# Patient Record
Sex: FEMALE | Race: WHITE | NOT HISPANIC OR LATINO | Employment: OTHER | URBAN - METROPOLITAN AREA
[De-identification: names, ages, dates, MRNs, and addresses within clinical notes are randomized per-mention and may not be internally consistent; named-entity substitution may affect disease eponyms.]

---

## 2011-02-01 LAB — CRC RECOMMENDATION EXT: NORMAL

## 2017-02-14 ENCOUNTER — HISTORICAL (OUTPATIENT)
Dept: RADIOLOGY | Facility: HOSPITAL | Age: 52
End: 2017-02-14

## 2017-07-18 ENCOUNTER — HISTORICAL (OUTPATIENT)
Dept: RADIOLOGY | Facility: HOSPITAL | Age: 52
End: 2017-07-18

## 2017-08-15 ENCOUNTER — HISTORICAL (OUTPATIENT)
Dept: RADIOLOGY | Facility: HOSPITAL | Age: 52
End: 2017-08-15

## 2018-02-15 ENCOUNTER — HISTORICAL (OUTPATIENT)
Dept: RADIOLOGY | Facility: HOSPITAL | Age: 53
End: 2018-02-15

## 2018-03-09 ENCOUNTER — HISTORICAL (OUTPATIENT)
Dept: RADIOLOGY | Facility: HOSPITAL | Age: 53
End: 2018-03-09

## 2018-08-21 ENCOUNTER — HISTORICAL (OUTPATIENT)
Dept: RADIOLOGY | Facility: HOSPITAL | Age: 53
End: 2018-08-21

## 2019-01-29 ENCOUNTER — HISTORICAL (OUTPATIENT)
Dept: ADMINISTRATIVE | Facility: HOSPITAL | Age: 54
End: 2019-01-29

## 2019-01-29 LAB
ALBUMIN SERPL-MCNC: 4.3 G/DL (ref 3.5–5.5)
ALBUMIN/GLOB SERPL: 1.4 {RATIO} (ref 1.2–2.2)
ALP SERPL-CCNC: 82 IU/L (ref 39–117)
ALT SERPL-CCNC: 12 IU/L (ref 0–32)
AST SERPL-CCNC: 14 IU/L (ref 0–40)
BASOPHILS # BLD AUTO: 0.1 X10E3/UL (ref 0–0.2)
BASOPHILS NFR BLD AUTO: 1 %
BILIRUB SERPL-MCNC: 0.7 MG/DL (ref 0–1.2)
BUN SERPL-MCNC: 9 MG/DL (ref 6–24)
CALCIUM SERPL-MCNC: 9.3 MG/DL (ref 8.7–10.2)
CHLORIDE SERPL-SCNC: 105 MMOL/L (ref 96–106)
CHOLEST SERPL-MCNC: 257 MG/DL (ref 100–199)
CHOLEST/HDLC SERPL: 2.6 RATIO (ref 0–4.4)
CO2 SERPL-SCNC: 21 MMOL/L (ref 20–29)
CREAT SERPL-MCNC: 0.58 MG/DL (ref 0.57–1)
CREAT/UREA NIT SERPL: 16 (ref 9–23)
EOSINOPHIL # BLD AUTO: 0.1 X10E3/UL (ref 0–0.4)
EOSINOPHIL NFR BLD AUTO: 2 %
ERYTHROCYTE [DISTWIDTH] IN BLOOD BY AUTOMATED COUNT: 13.2 % (ref 12.3–15.4)
GLOBULIN SER-MCNC: 3.1 G/DL (ref 1.5–4.5)
GLUCOSE SERPL-MCNC: 91 MG/DL (ref 65–99)
HCT VFR BLD AUTO: 39.1 % (ref 34–46.6)
HDLC SERPL-MCNC: 98 MG/DL
HGB BLD-MCNC: 12.9 G/DL (ref 11.1–15.9)
LDLC SERPL CALC-MCNC: 133 MG/DL (ref 0–99)
LYMPHOCYTES # BLD AUTO: 3.1 X10E3/UL (ref 0.7–3.1)
LYMPHOCYTES NFR BLD AUTO: 40 %
MCH RBC QN AUTO: 30.1 PG (ref 26.6–33)
MCHC RBC AUTO-ENTMCNC: 33 G/DL (ref 31.5–35.7)
MCV RBC AUTO: 91 FL (ref 79–97)
MONOCYTES # BLD AUTO: 0.4 X10E3/UL (ref 0.1–0.9)
MONOCYTES NFR BLD AUTO: 6 %
NEUTROPHILS # BLD AUTO: 4.1 X10E3/UL (ref 1.4–7)
NEUTROPHILS NFR BLD AUTO: 51 %
PLATELET # BLD AUTO: 291 X10E3/UL (ref 150–379)
POTASSIUM SERPL-SCNC: 4.1 MMOL/L (ref 3.5–5.2)
PROT SERPL-MCNC: 7.4 G/DL (ref 6–8.5)
RBC # BLD AUTO: 4.28 X10(6)/MCL (ref 3.77–5.28)
SODIUM SERPL-SCNC: 142 MMOL/L (ref 134–144)
TRIGL SERPL-MCNC: 128 MG/DL (ref 0–149)
TSH SERPL-ACNC: 1.51 MIU/ML (ref 0.45–4.5)
VLDLC SERPL CALC-MCNC: 26 MG/DL (ref 5–40)
WBC # SPEC AUTO: 7.8 X10E3/UL (ref 3.4–10.8)

## 2019-02-04 ENCOUNTER — HISTORICAL (OUTPATIENT)
Dept: RADIOLOGY | Facility: HOSPITAL | Age: 54
End: 2019-02-04

## 2019-02-11 ENCOUNTER — HISTORICAL (OUTPATIENT)
Dept: RADIOLOGY | Facility: HOSPITAL | Age: 54
End: 2019-02-11

## 2019-02-12 ENCOUNTER — HISTORICAL (OUTPATIENT)
Dept: RADIOLOGY | Facility: HOSPITAL | Age: 54
End: 2019-02-12

## 2019-02-20 ENCOUNTER — HISTORICAL (OUTPATIENT)
Dept: ADMINISTRATIVE | Facility: HOSPITAL | Age: 54
End: 2019-02-20

## 2019-02-25 ENCOUNTER — HISTORICAL (OUTPATIENT)
Dept: ADMINISTRATIVE | Facility: HOSPITAL | Age: 54
End: 2019-02-25

## 2019-02-25 LAB
ABS NEUT (OLG): 4.43 X10(3)/MCL (ref 2.1–9.2)
ALBUMIN SERPL-MCNC: 3.8 GM/DL (ref 3.4–5)
ALBUMIN/GLOB SERPL: 1 RATIO (ref 1–2)
ALP SERPL-CCNC: 90 UNIT/L (ref 45–117)
ALT SERPL-CCNC: 19 UNIT/L (ref 13–56)
APPEARANCE, UA: CLEAR
APTT PPP: 29.2 SECOND(S) (ref 24.8–36.9)
AST SERPL-CCNC: 11 UNIT/L (ref 15–37)
BILIRUB SERPL-MCNC: 0.9 MG/DL (ref 0.2–1)
BILIRUB UR QL STRIP: NEGATIVE
BILIRUBIN DIRECT+TOT PNL SERPL-MCNC: 0.1 MG/DL (ref 0–0.2)
BILIRUBIN DIRECT+TOT PNL SERPL-MCNC: 0.8 MG/DL (ref 0–1)
BUN SERPL-MCNC: 13 MG/DL (ref 7–18)
CALCIUM SERPL-MCNC: 8.9 MG/DL (ref 8.5–10.1)
CHLORIDE SERPL-SCNC: 106 MMOL/L (ref 98–107)
CO2 SERPL-SCNC: 25 MMOL/L (ref 21–32)
COLOR UR: YELLOW
CREAT SERPL-MCNC: 0.68 MG/DL (ref 0.55–1.02)
ERYTHROCYTE [DISTWIDTH] IN BLOOD BY AUTOMATED COUNT: 13.3 % (ref 11.5–17)
GLOBULIN SER-MCNC: 4 GM/DL (ref 2–4)
GLUCOSE (UA): NEGATIVE
GLUCOSE SERPL-MCNC: 83 MG/DL (ref 74–106)
HCT VFR BLD AUTO: 42.3 % (ref 37–47)
HGB BLD-MCNC: 13.8 GM/DL (ref 12–16)
HGB UR QL STRIP: NEGATIVE
INR PPP: 0.9 (ref 0–1.3)
KETONES UR QL STRIP: NEGATIVE
LEUKOCYTE ESTERASE UR QL STRIP: NEGATIVE
MCH RBC QN AUTO: 30.4 PG (ref 27–31)
MCHC RBC AUTO-ENTMCNC: 32.6 GM/DL (ref 33–36)
MCV RBC AUTO: 93.2 FL (ref 80–94)
NITRITE UR QL STRIP: NEGATIVE
PH UR STRIP: 5.5 [PH] (ref 5–7)
PLATELET # BLD AUTO: 287 X10(3)/MCL (ref 130–400)
PMV BLD AUTO: 10.6 FL (ref 7.4–10.4)
POTASSIUM SERPL-SCNC: 4.1 MMOL/L (ref 3.5–5.1)
PROT SERPL-MCNC: 7.9 GM/DL (ref 6.4–8.2)
PROT UR QL STRIP: NEGATIVE
PROTHROMBIN TIME: 12.4 SECOND(S) (ref 12.2–14.7)
RBC # BLD AUTO: 4.54 X10(6)/MCL (ref 4.2–5.4)
SODIUM SERPL-SCNC: 138 MMOL/L (ref 136–145)
SP GR UR STRIP: 1.02 (ref 1–1.03)
TRANSFERRIN SERPL-MCNC: 285 MG/DL (ref 200–360)
UROBILINOGEN UR STRIP-ACNC: NEGATIVE
WBC # SPEC AUTO: 8.6 X10(3)/MCL (ref 4.5–11.5)

## 2019-02-27 LAB — FINAL CULTURE: NORMAL

## 2019-03-07 ENCOUNTER — HOSPITAL ENCOUNTER (OUTPATIENT)
Dept: ORTHOPEDICS | Facility: HOSPITAL | Age: 54
End: 2019-03-08
Attending: SPECIALIST | Admitting: SPECIALIST

## 2019-03-07 LAB
HCT VFR BLD AUTO: 38.6 % (ref 37–47)
HGB BLD-MCNC: 12.8 GM/DL (ref 12–16)

## 2019-03-08 LAB
BUN SERPL-MCNC: 9 MG/DL (ref 7–18)
CALCIUM SERPL-MCNC: 8.1 MG/DL (ref 8.5–10.1)
CHLORIDE SERPL-SCNC: 109 MMOL/L (ref 98–107)
CO2 SERPL-SCNC: 25 MMOL/L (ref 21–32)
CREAT SERPL-MCNC: 0.63 MG/DL (ref 0.55–1.02)
CREAT/UREA NIT SERPL: 14
GLUCOSE SERPL-MCNC: 111 MG/DL (ref 74–106)
POTASSIUM SERPL-SCNC: 3.8 MMOL/L (ref 3.5–5.1)
SODIUM SERPL-SCNC: 141 MMOL/L (ref 136–145)

## 2019-04-11 LAB
INFLUENZA A ANTIGEN, POC: POSITIVE
INFLUENZA B ANTIGEN, POC: NEGATIVE
RAPID GROUP A STREP (OHS): NEGATIVE

## 2019-05-10 ENCOUNTER — HISTORICAL (OUTPATIENT)
Dept: RADIOLOGY | Facility: HOSPITAL | Age: 54
End: 2019-05-10

## 2019-05-10 LAB
BUN SERPL-MCNC: 11.6 MG/DL (ref 7–18)
CREAT SERPL-MCNC: 0.67 MG/DL (ref 0.6–1.3)

## 2019-06-24 ENCOUNTER — HISTORICAL (OUTPATIENT)
Dept: ADMINISTRATIVE | Facility: HOSPITAL | Age: 54
End: 2019-06-24

## 2019-08-06 ENCOUNTER — HISTORICAL (OUTPATIENT)
Dept: ADMINISTRATIVE | Facility: HOSPITAL | Age: 54
End: 2019-08-06

## 2019-08-06 LAB
ALBUMIN SERPL-MCNC: 4.6 G/DL (ref 3.5–5.5)
ALBUMIN/GLOB SERPL: 1.9 {RATIO} (ref 1.2–2.2)
ALP SERPL-CCNC: 90 IU/L (ref 39–117)
ALT SERPL-CCNC: 17 IU/L (ref 0–32)
AST SERPL-CCNC: 17 IU/L (ref 0–40)
BILIRUB SERPL-MCNC: 0.7 MG/DL (ref 0–1.2)
BUN SERPL-MCNC: 9 MG/DL (ref 6–24)
CALCIUM SERPL-MCNC: 9.9 MG/DL (ref 8.7–10.2)
CHLORIDE SERPL-SCNC: 104 MMOL/L (ref 96–106)
CHOLEST SERPL-MCNC: 265 MG/DL (ref 100–199)
CHOLEST/HDLC SERPL: 3.4 RATIO (ref 0–4.4)
CO2 SERPL-SCNC: 24 MMOL/L (ref 20–29)
CREAT SERPL-MCNC: 0.63 MG/DL (ref 0.57–1)
CREAT/UREA NIT SERPL: 14 (ref 9–23)
GLOBULIN SER-MCNC: 2.4 G/DL (ref 1.5–4.5)
GLUCOSE SERPL-MCNC: 96 MG/DL (ref 65–99)
HDLC SERPL-MCNC: 77 MG/DL
LDLC SERPL CALC-MCNC: 153 MG/DL (ref 0–99)
POTASSIUM SERPL-SCNC: 5 MMOL/L (ref 3.5–5.2)
PROT SERPL-MCNC: 7 G/DL (ref 6–8.5)
SODIUM SERPL-SCNC: 144 MMOL/L (ref 134–144)
TRIGL SERPL-MCNC: 173 MG/DL (ref 0–149)
VLDLC SERPL CALC-MCNC: 35 MG/DL (ref 5–40)

## 2019-08-16 ENCOUNTER — HISTORICAL (OUTPATIENT)
Dept: RADIOLOGY | Facility: HOSPITAL | Age: 54
End: 2019-08-16

## 2019-08-23 ENCOUNTER — HISTORICAL (OUTPATIENT)
Dept: RADIOLOGY | Facility: HOSPITAL | Age: 54
End: 2019-08-23

## 2019-11-04 ENCOUNTER — HISTORICAL (OUTPATIENT)
Dept: ADMINISTRATIVE | Facility: HOSPITAL | Age: 54
End: 2019-11-04

## 2019-12-04 ENCOUNTER — HISTORICAL (OUTPATIENT)
Dept: RADIOLOGY | Facility: HOSPITAL | Age: 54
End: 2019-12-04

## 2019-12-29 LAB
INFLUENZA A ANTIGEN, POC: NEGATIVE
INFLUENZA B ANTIGEN, POC: NEGATIVE
RAPID GROUP A STREP (OHS): NEGATIVE

## 2020-01-15 ENCOUNTER — HISTORICAL (OUTPATIENT)
Dept: ADMINISTRATIVE | Facility: HOSPITAL | Age: 55
End: 2020-01-15

## 2020-02-26 ENCOUNTER — HISTORICAL (OUTPATIENT)
Dept: ADMINISTRATIVE | Facility: HOSPITAL | Age: 55
End: 2020-02-26

## 2020-02-26 LAB
ALBUMIN SERPL-MCNC: 4.9 G/DL (ref 3.8–4.9)
ALBUMIN/GLOB SERPL: 1.7 {RATIO} (ref 1.2–2.2)
ALP SERPL-CCNC: 93 IU/L (ref 39–117)
ALT SERPL-CCNC: 35 IU/L (ref 0–32)
AST SERPL-CCNC: 25 IU/L (ref 0–40)
BASOPHILS # BLD AUTO: 0.1 X10E3/UL (ref 0–0.2)
BASOPHILS NFR BLD AUTO: 1 %
BILIRUB SERPL-MCNC: 0.7 MG/DL (ref 0–1.2)
BUN SERPL-MCNC: 11 MG/DL (ref 6–24)
CALCIUM SERPL-MCNC: 10.7 MG/DL (ref 8.7–10.2)
CHLORIDE SERPL-SCNC: 99 MMOL/L (ref 96–106)
CHOLEST SERPL-MCNC: 266 MG/DL (ref 100–199)
CHOLEST/HDLC SERPL: 3.1 RATIO (ref 0–4.4)
CO2 SERPL-SCNC: 24 MMOL/L (ref 20–29)
CREAT SERPL-MCNC: 0.86 MG/DL (ref 0.57–1)
CREAT/UREA NIT SERPL: 13 (ref 9–23)
EOSINOPHIL # BLD AUTO: 0.2 X10E3/UL (ref 0–0.4)
EOSINOPHIL NFR BLD AUTO: 2 %
ERYTHROCYTE [DISTWIDTH] IN BLOOD BY AUTOMATED COUNT: 13.6 % (ref 11.7–15.4)
GLOBULIN SER-MCNC: 2.9 G/DL (ref 1.5–4.5)
GLUCOSE SERPL-MCNC: 86 MG/DL (ref 65–99)
HCT VFR BLD AUTO: 44.6 % (ref 34–46.6)
HDLC SERPL-MCNC: 87 MG/DL
HGB BLD-MCNC: 14.5 G/DL (ref 11.1–15.9)
INFLUENZA A ANTIGEN, POC: NEGATIVE
INFLUENZA B ANTIGEN, POC: NEGATIVE
LDLC SERPL CALC-MCNC: 138 MG/DL (ref 0–99)
LYMPHOCYTES # BLD AUTO: 3.4 X10E3/UL (ref 0.7–3.1)
LYMPHOCYTES NFR BLD AUTO: 37 %
MCH RBC QN AUTO: 30.1 PG (ref 26.6–33)
MCHC RBC AUTO-ENTMCNC: 32.5 G/DL (ref 31.5–35.7)
MCV RBC AUTO: 93 FL (ref 79–97)
MONOCYTES # BLD AUTO: 0.7 X10E3/UL (ref 0.1–0.9)
MONOCYTES NFR BLD AUTO: 7 %
NEUTROPHILS # BLD AUTO: 4.9 X10E3/UL (ref 1.4–7)
NEUTROPHILS NFR BLD AUTO: 53 %
PLATELET # BLD AUTO: 310 X10E3/UL (ref 150–450)
POTASSIUM SERPL-SCNC: 4 MMOL/L (ref 3.5–5.2)
PROT SERPL-MCNC: 7.8 G/DL (ref 6–8.5)
RBC # BLD AUTO: 4.82 X10(6)/MCL (ref 3.77–5.28)
SODIUM SERPL-SCNC: 141 MMOL/L (ref 134–144)
TRIGL SERPL-MCNC: 206 MG/DL (ref 0–149)
TSH SERPL-ACNC: 1.6 MIU/ML (ref 0.45–4.5)
VLDLC SERPL CALC-MCNC: 41 MG/DL (ref 5–40)
WBC # SPEC AUTO: 9.3 X10E3/UL (ref 3.4–10.8)

## 2020-02-27 ENCOUNTER — HISTORICAL (OUTPATIENT)
Dept: RADIOLOGY | Facility: HOSPITAL | Age: 55
End: 2020-02-27

## 2020-09-01 ENCOUNTER — HISTORICAL (OUTPATIENT)
Dept: LAB | Facility: HOSPITAL | Age: 55
End: 2020-09-01

## 2020-09-01 LAB
ALBUMIN SERPL-MCNC: 3.6 GM/DL (ref 3.4–5)
ALBUMIN/GLOB SERPL: 0.9 RATIO (ref 1.1–2)
ALP SERPL-CCNC: 95 UNIT/L (ref 46–116)
ALT SERPL-CCNC: 28 UNIT/L (ref 12–78)
AST SERPL-CCNC: 18 UNIT/L (ref 15–37)
BILIRUB SERPL-MCNC: 0.7 MG/DL (ref 0.2–1)
BILIRUBIN DIRECT+TOT PNL SERPL-MCNC: 0.16 MG/DL (ref 0–0.2)
BILIRUBIN DIRECT+TOT PNL SERPL-MCNC: 0.54 MG/DL (ref 0–0.8)
BUN SERPL-MCNC: 10.5 MG/DL (ref 7–18)
CALCIUM SERPL-MCNC: 9.6 MG/DL (ref 8.5–10.1)
CHLORIDE SERPL-SCNC: 106 MMOL/L (ref 98–107)
CHOLEST SERPL-MCNC: 210 MG/DL (ref 0–200)
CHOLEST/HDLC SERPL: 2.6 {RATIO} (ref 0–4)
CO2 SERPL-SCNC: 29.4 MMOL/L (ref 21–32)
CREAT SERPL-MCNC: 0.8 MG/DL (ref 0.6–1.3)
GLOBULIN SER-MCNC: 4 GM/DL (ref 2.4–3.5)
GLUCOSE SERPL-MCNC: 101 MG/DL (ref 74–106)
HDLC SERPL-MCNC: 82 MG/DL (ref 40–60)
LDLC SERPL CALC-MCNC: 94 MG/DL (ref 0–129)
POTASSIUM SERPL-SCNC: 4 MMOL/L (ref 3.5–5.1)
PROT SERPL-MCNC: 7.6 GM/DL (ref 6.4–8.2)
SODIUM SERPL-SCNC: 140 MMOL/L (ref 136–145)
TRIGL SERPL-MCNC: 170 MG/DL
VLDLC SERPL CALC-MCNC: 34 MG/DL

## 2022-04-10 ENCOUNTER — HISTORICAL (OUTPATIENT)
Dept: ADMINISTRATIVE | Facility: HOSPITAL | Age: 57
End: 2022-04-10

## 2022-04-11 ENCOUNTER — HISTORICAL (OUTPATIENT)
Dept: ADMINISTRATIVE | Facility: HOSPITAL | Age: 57
End: 2022-04-11

## 2022-04-28 VITALS
OXYGEN SATURATION: 100 % | DIASTOLIC BLOOD PRESSURE: 82 MMHG | BODY MASS INDEX: 30.47 KG/M2 | SYSTOLIC BLOOD PRESSURE: 132 MMHG | WEIGHT: 189.63 LBS | HEIGHT: 66 IN

## 2022-04-28 VITALS
SYSTOLIC BLOOD PRESSURE: 132 MMHG | DIASTOLIC BLOOD PRESSURE: 82 MMHG | OXYGEN SATURATION: 100 % | HEIGHT: 66 IN | BODY MASS INDEX: 30.47 KG/M2 | WEIGHT: 189.63 LBS

## 2022-04-30 NOTE — OP NOTE
DATE OF SURGERY:    03/07/2019    SURGEON:  Matt Castro MD  ASSISTANT:  RACHEL Campa    PREOPERATIVE DIAGNOSIS:  Advanced osteoarthritis of medial compartment, right knee.    POSTOPERATIVE DIAGNOSIS:  Advanced osteoarthritis of medial compartment, right knee.    PROCEDURE PERFORMED:  Robotic-assisted medial compartment right knee, partial knee arthroplasty.    COUNTS:  Lap, needle, sponge counts correct.    COMPLICATIONS:  None.    ESTIMATED BLOOD LOSS:  Less than 10 mL.    IMPLANTS:  Gresham Restoris partial knee arthroplasty with a size 4 right femoral component, size 5 tibial component, 8 mm thickness polyethylene insert, tobramycin-impregnated cement.    HISTORY:  Ms. Scott is 54 years old with medial compartment osteoarthritis of the right knee, elected to undergo a right knee partial knee arthroplasty of the medial compartment after failed nonoperative treatment.  Risks, benefits, alternatives, and complications of operative and nonoperative treatment were explained.  She understood, agreed, and wanted to proceed with operative intervention.  Valid informed consent was obtained.    PROCEDURE IN DETAIL:  She was brought to the operating room and placed supine on the table, and underwent regional anesthesia.  She was sedated.  Bacon catheter was applied.  Tourniquet was placed on the right thigh.  The usual sterile ChloraPrep scrub and paint, followed by sterile draping was performed.  Ioban dressing was placed.  Esmarch bandage was used to exsanguinate the right lower extremity.  Tourniquet was inflated.  The knee was flexed.  Pins were placed into the femur and the tibia followed by assembly and registration of the femoral and tibial arrays.  Hip center and ankle center registration was performed.  Anteromedial incision was then made followed by coagulation of skin bleeders.  The capsule was incised.  A self-retaining retractor was placed.  The medial meniscus was excised.  The medial  compartment articular cartilage was denuded down to subchondral bone.  The lateral and patellofemoral articular cartilage were normal.  The anterior cruciate ligament was normal.  Medial and lateral ligaments were normal.  The femoral and tibial checkpoints were placed and registered.  Fine point registration of the femur and the tibia was performed, followed by excision of osteophytes and ligament balancing.  When the plane was balanced symmetrically and appropriately throughout a range of motion, robotic-assisted size 4 femoral preparation and size 5 tibial preparation was performed.  The most posterior aspect of the medial meniscus was removed.  Size 5 tibial trial, along with a size 4 femoral trial, and an 8 mm polyethylene trial were placed.  The knee was brought into full extension.  Range of motion was 0 degrees to 133 degrees.  There was excellent balancing and tracking throughout a range of motion.  Trials were removed.  Cut surfaces were irrigated, suctioned, and dried.  Tobramycin-impregnated cement was mixed.  When it was proper consistency, a size 5 tibial component was cemented into place, followed by cementation of a size 4 femoral component, and press-fit of an 8 mm thickness polyethylene insert.  Excess cement was excised.  Once cement had cured, tourniquet was deflated.  Hemostasis was obtained.  There was no abnormal bleeding.  The knee was irrigated, suctioned, and injected for postoperative pain control.  A range of motion was 0 degrees to 133 degrees with excellent balancing and tracking throughout a range of motion.  The arthrotomy was then closed with #1 braided suture followed by reapproximation of skin edges with 2-0 Vicryl suture, and surgical staples used for skin closure.  Sterile dressings were applied.  The patient was taken to recovery room in stable condition.        ______________________________  Matt Castro MD    SY/UN  DD:  03/07/2019  Time:  08:17AM  DT:  03/07/2019  Time:   08:48AM  Job #:  [id number]

## 2022-05-03 NOTE — HISTORICAL [facility] CERNER
This is a historical note converted from [employer]. Formatting and pictures may have been removed.  Please reference [employer] for original formatting and attached multimedia. Chief Complaint  Wellness Exam, pt also complains of cough and congestion  History of Present Illness  This is a 55-year-old white female who presents the clinic today for annual wellness exam.? Patient has a history of hyperlipidemia, hypertension, thyroid nodule, depression, GERD, arthritis.? Patient states she is doing well with her medications and denies any side effects. ?Does have a complaint today of cough, congestion, sneezing, watery eyes x2 days.? Denies fever or body aches.  Review of Systems  Constitutional: Negative except as documented in history of present illness.?  Eye: Negative except as documented in history of present illness.  Ear/Nose/Mouth/Throat: Negative except as documented in history of present illness.  Respiratory: Negative except as documented in history of present illness.  Cardiovascular: Negative except as documented in history of present illness.  Breast: Negative.  Gastrointestinal: Negative except as documented in history of present illness.  Genitourinary: Negative except as documented in history of present illness.  Gynecologic: Negative, Negative except as documented in history of present illness.  Hematology/Lymphatics: Negative except as documented in history of present illness.  Endocrine: Negative except as documented in history of present illness.  Immunologic: Negative except as documented in history of present illness.  Musculoskeletal: Negative except as documented in history of present illness.  Integumentary: Negative except as documented in history of present illness.  Neurologic: Negative except as documented in history of present illness.  Psychiatric: Negative except as documented in history of present illness.  All other systems are negative  ?  Physical Exam  Vitals & Measurements  T:?36.9? ?C  (Oral)? HR:?97(Peripheral)? RR:?17? BP:?121/81? SpO2:?100%?  HT:?167?cm? WT:?86?kg? BMI:?30.84?  General: Alert and oriented, No acute distress.?  Eye: Pupils are equal, round and reactive to light, Extraocular movements are intact, Normal conjunctiva.  HENT: Normocephalic, No damage to dentition, Tympanic membranes are clear, Good light reflex, Normal hearing, Oral mucosa is moist, No pharyngeal erythema, No sinus tenderness. ?Postnasal drip and rhinorrhea noted.  Neck: Supple, Non-tender.  Respiratory: Lungs are clear to auscultation, Respirations are non-labored, Breath sounds are equal, Symmetrical chest wall expansion.  Cardiovascular: Normal rate, Regular rhythm, No murmur, No edema.  Gastrointestinal: Soft, Non-tender, Non-distended, Normal bowel sounds.  Musculoskeletal: Normal range of motion, Normal strength, No tenderness, No swelling, No deformity, Normal gait.  Integumentary: Warm, Dry, Pink.  Neurologic: Alert, Oriented, Normal sensory, Normal motor function, No focal deficits.  Cognition and Speech: Oriented, Speech clear and coherent, Functional cognition intact.  Psychiatric: Cooperative, Appropriate mood & affect, Normal judgment, Non-suicidal.  ?  Assessment/Plan  1.?Annual physical exam?Z00.00  Ordered:  CBC w/ Auto Diff, Routine collect, 02/26/20 8:12:00 CST, Blood, [facility], Stop date 02/26/20 8:13:00 CST, Lab Collect, Annual physical exam, 02/26/20 8:12:00 CST  Comprehensive Metabolic Panel, Routine collect, 02/26/20 8:12:00 CST, Blood, [facility], Stop date 02/26/20 8:13:00 CST, Lab Collect, Annual physical exam, 02/26/20 8:12:00 CST  Lipid Panel, Routine collect, 02/26/20 8:12:00 CST, Blood, [facility], Stop date 02/26/20 8:13:00 CST, Lab Collect, Annual physical exam, 02/26/20 8:12:00 CST  Preventative Health Care Est 40-64 years 19699 PC, Annual physical exam  Hyperlipidemia LDL goal <130  Hypertension  Thyroid nodule  Breast cancer screening by mammogram  Acute URI,  [facility], 02/26/20 14:31:00 CST  Thyroid Stimulating Hormone, Routine collect, 02/26/20 8:12:00 CST, Blood, [facility], Stop date 02/26/20 8:13:00 CST, Lab Collect, Annual physical exam, 02/26/20 8:12:00 CST  ?  2.?Hyperlipidemia LDL goal <130?E78.5  ?Labs today, will call with results, follow-up 6 months.  Ordered:  Comprehensive Metabolic Panel, Routine collect, *Est. 08/26/20 3:00:00 CDT, Blood, Order for future visit, *Est. Stop date 08/26/20 3:00:00 CDT, Lab Collect, Hyperlipidemia LDL goal <130, 02/26/20 8:14:00 CST  Lipid Panel, Routine collect, *Est. 08/26/20 3:00:00 CDT, Blood, Order for future visit, *Est. Stop date 08/26/20 3:00:00 CDT, Lab Collect, Hyperlipidemia LDL goal <130, 02/26/20 8:14:00 CST  Preventative Health Care Est 40-64 years 15348 PC, Annual physical exam  Hyperlipidemia LDL goal <130  Hypertension  Thyroid nodule  Breast cancer screening by mammogram  Acute UR, [facility], 02/26/20 14:31:00 CST  ?  3.?Hypertension?I10  ?Stable, continue current meds, follow-up 6 months.  Ordered:  Preventative Health Care Est 40-64 years 93534 PC, Annual physical exam  Hyperlipidemia LDL goal <130  Hypertension  Thyroid nodule  Breast cancer screening by mammogram  Acute URI, [facility], 02/26/20 14:31:00 CST  ?  4.?Thyroid nodule?E04.1  ?Repeat ultrasound ordered  Ordered:  Preventative Health Care Est 40-64 years 46042 PC, Annual physical exam  Hyperlipidemia LDL goal <130  Hypertension  Thyroid nodule  Breast cancer screening by mammogram  Acute URI, [facility], 02/26/20 14:31:00 CST  ?  5.?Breast cancer screening by mammogram?Z12.31  ?Mammogram ordered for this summer.  Ordered:  [facility] Screening Bilateral, Routine, *Est. 08/26/20 9:00:00 CDT, Screening, Z12.31, None, Ambulatory, Patient Has IV?, Due after 8/23/2020, Rad Type, Order for future visit, Breast cancer screening by  mammogram, Schedule this test, [facility], *Est. 08/26/20 9:00:00 CDT  Preventative Health Care Est 40-64 years 62871 PC, Annual physical exam  Hyperlipidemia LDL goal <130  Hypertension  Thyroid nodule  Breast cancer screening by mammogram  Acute URI, [facility], 02/26/20 14:31:00 CST  ?  6.?Acute URI?J06.9  ?Celestone?12 mg IM today.  Ordered:  betamethasone, 12 mg, IM, Once-Unscheduled, first dose 02/26/20 14:31:00 CST  Preventative Health Care Est 40-64 years 39930 PC, Annual physical exam  Hyperlipidemia LDL goal <130  Hypertension  Thyroid nodule  Breast cancer screening by mammogram  Acute URI, [facility], 02/26/20 14:31:00 CST  ?  Orders:  Flu A/B POC [id number] PC, 02/26/20 14:31:00 CST, [facility]   Problem List/Past Medical History  Ongoing  Depression  GERD - Gastro-esophageal reflux disease  History of unicondylar arthroplasty of right knee  Hyperlipidemia LDL goal <130  Hypertension  Lateral epicondylitis, left elbow  Obesity  Osteoarthritis of right knee  Primary osteoarthritis of right hip  Statin-induced myositis  Status post total hip replacement, right  Thyroid nodule  Historical  Arthritis  Back pain  Bone Injury  Decreased vision  Diarrhea  Hair loss  History of chicken pox  History of measles  Leg pain  Vocal cord paralysis  Procedure/Surgical History  [facility] Total Hip Arthroplasty (Right) (12/17/2019)  Replacement of Right Hip Joint with Synthetic Substitute, Uncemented, Open Approach (12/17/2019)  Robotic Assisted Procedure of Lower Extremity, Open Approach (12/17/2019)  Arthroplasty, knee, condyle and plateau; medial OR lateral compartment (03/07/2019)  [facility] Partial Knee Arthroplasty (Right) (03/07/2019)  Replacement of Right Knee Joint with Medial Unicondylar Synthetic Substitute, Cemented, Open Approach (03/07/2019)  Fine needle aspiration biopsy, including ultrasound guidance; each additional lesion  (List separately in addition to code for primary procedure) (02/11/2019)  Fine needle aspiration biopsy, including ultrasound guidance; each additional lesion (List separately in addition to code for primary procedure) (02/11/2019)  Fine needle aspiration biopsy, including ultrasound guidance; first lesion (02/11/2019)  Inspection of Thyroid Gland, Percutaneous Approach (02/11/2019)  Excision of Left Breast, Open Approach (10/05/2016)  Lumpectomy Breast (Left) (10/05/2016)  Mastectomy, partial (eg, lumpectomy, tylectomy, quadrantectomy, segmentectomy); (10/05/2016)  Biopsy, breast, with placement of breast localization device(s) (eg, clip, metallic pellet), when performed, and imaging of the biopsy specimen, when performed, percutaneous; first lesion, including ultrasound guidance (08/30/2016)  Excision of Left Breast, Percutaneous Approach, Diagnostic (08/30/2016)  ACL - Repair of anterior cruciate ligament  Appendectomy  Arthroscopic repair of meniscus  Carpal tunnel release  colonoscopy  cyst back of neck  cyst left wrist  Decompression of ulnar nerve  Hysterectomy  lumpectomy left breast  Total replacement of left hip joint   Medications  aspirin 81 mg oral Delayed Release (EC) tablet, 81 mg= 1 tab(s), Oral, BID  hydrochlorothiazide 25 mg oral tablet, 25 mg= 1 tab(s), Oral, Daily, 1 refills  ibuprofen 800 mg oral tablet, 800 mg= 1 tab(s), Oral, TID  omeprazole 40 mg oral DR capsule, 40 mg= 1 cap(s), Oral, qPM  Toradol 10 mg oral tablet, 10 mg= 1 tab(s), Oral, QID  Zetia 10 mg oral tablet, 10 mg= 1 tab(s), Oral, Daily, 1 refills  Allergies  No Known Allergies  No Known Medication Allergies  Social History  Abuse/Neglect  No, 12/29/2019  No, 12/17/2019  No, 12/04/2019  Alcohol  Current, Beer, Liquor, 1-2 times per year, 12/04/2019  Current, Beer, 1-2 times per month, 02/25/2019  Current, 08/11/2016  Employment/School  Retired, 12/04/2019  Retired, 02/25/2019  Exercise  Home/Environment  Lives with Children,  Spouse., 12/04/2019  Lives with Children, Spouse., 02/25/2019  Nutrition/Health  Regular, 12/04/2019  Regular, 02/25/2019  Substance Use  Never, 12/04/2019  Never, 02/25/2019  Tobacco  Former smoker, quit more than 30 days ago, N/A, 01/15/2020  Family History  Acute myocardial infarction.: Negative: Father.  Alzheimers disease: Uncle and Uncle.  Atrial fibrillation: Sister.  Cardiac arrhythmia.: Brother.  Primary malignant neoplasm of lung: Mother.  Tobacco use: Mother, Father, Sister and Brother.  Health Maintenance  Health Maintenance  ???Pending?(in the next year)  ??? ??OverDue  ??? ? ? ?Coronary Artery Disease Maintenance-Antiplatelet Agent Prescribed due??and every?  ??? ? ? ?Coronary Artery Disease Maintenance-Lipid Lowering Therapy due??and every?  ??? ? ? ?Diabetes Screening due??and every?  ??? ??Due In Future?  ??? ? ? ?Aspirin Therapy for CVD Prevention not due until??12/18/20??and every 1??year(s)  ??? ? ? ?Hypertension Management-BMP not due until??12/28/20??and every 1??year(s)  ??? ? ? ?Alcohol Misuse Screening not due until??01/01/21??and every 1??year(s)  ??? ? ? ?Obesity Screening not due until??01/01/21??and every 1??year(s)  ??? ? ? ?Colorectal Screening not due until??01/29/21??and every 10??year(s)  ??? ? ? ?Blood Pressure Screening not due until??02/25/21??and every 1??year(s)  ??? ? ? ?Body Mass Index Check not due until??02/25/21??and every 1??year(s)  ??? ? ? ?Hypertension Management-Blood Pressure not due until??02/25/21??and every 1??year(s)  ???Satisfied?(in the past 1 year)  ??? ??Satisfied?  ??? ? ? ?ADL Screening on??02/26/20.??Satisfied by Navarrete-Suzanne LPN, Noemi M.  ??? ? ? ?Alcohol Misuse Screening on??02/26/20.??Satisfied by Noemi Michaud LPN  ??? ? ? ?Aspirin Therapy for CVD Prevention on??12/18/19.??Satisfied by Jeannie Vanessa NP  ??? ? ? ?Blood Pressure Screening on??02/26/20.??Satisfied by Noemi Michaud LPN  ??? ? ? ?Body Mass Index Check  on??02/26/20.??Satisfied by Noemi Michaud LPN  ??? ? ? ?Breast Cancer Screening on??08/23/19.??Satisfied by Amairani Burciaga  ??? ? ? ?Coronary Artery Disease Maintenance-Antiplatelet Agent Prescribed on??12/18/19.??Satisfied by Jeannie Vanessa NP  ??? ? ? ?Depression Screening on??02/26/20.??Satisfied by Noemi Michaud LPN  ??? ? ? ?Diabetes Screening on??12/29/19.??Satisfied by Jalen Lake  ??? ? ? ?Hypertension Management-Blood Pressure on??02/26/20.??Satisfied by Noemi Michaud LPN  ??? ? ? ?Hypertension Management-Education on??02/26/20.??Satisfied by Noemi Michaud LPN??Reason: Expectation Satisfied Elsewhere  ??? ? ? ?Hypertension Management-BMP on??12/29/19.??Satisfied by Jalen Lake  ??? ? ? ?Influenza Vaccine on??12/29/19.??Satisfied by Thomas Rivera LPN  ??? ? ? ?Lipid Screening on??08/06/19.??Satisfied by Cont[facility]system, [email or web address]  ??? ? ? ?Obesity Screening on??02/26/20.??Satisfied by Noemi Michaud LPN  ?  Lab Results  Test Name Test Result Date/Time   Influenza A POC Negative 02/26/2020 14:31 CST   Influenza B POC Negative 02/26/2020 14:31 CST

## 2022-09-21 ENCOUNTER — HISTORICAL (OUTPATIENT)
Dept: ADMINISTRATIVE | Facility: HOSPITAL | Age: 57
End: 2022-09-21

## 2024-01-22 ENCOUNTER — OFFICE VISIT (OUTPATIENT)
Dept: FAMILY MEDICINE | Facility: CLINIC | Age: 59
End: 2024-01-22
Payer: COMMERCIAL

## 2024-01-22 VITALS
RESPIRATION RATE: 16 BRPM | TEMPERATURE: 98 F | BODY MASS INDEX: 33.05 KG/M2 | SYSTOLIC BLOOD PRESSURE: 136 MMHG | OXYGEN SATURATION: 97 % | DIASTOLIC BLOOD PRESSURE: 86 MMHG | HEART RATE: 87 BPM | WEIGHT: 205.63 LBS | HEIGHT: 66 IN

## 2024-01-22 DIAGNOSIS — I10 PRIMARY HYPERTENSION: ICD-10-CM

## 2024-01-22 DIAGNOSIS — E04.1 THYROID NODULE: ICD-10-CM

## 2024-01-22 DIAGNOSIS — Z00.00 ANNUAL PHYSICAL EXAM: ICD-10-CM

## 2024-01-22 DIAGNOSIS — Z13.1 SCREENING FOR DIABETES MELLITUS: ICD-10-CM

## 2024-01-22 DIAGNOSIS — E78.5 HYPERLIPIDEMIA, UNSPECIFIED HYPERLIPIDEMIA TYPE: ICD-10-CM

## 2024-01-22 DIAGNOSIS — F32.A DEPRESSIVE DISORDER: ICD-10-CM

## 2024-01-22 DIAGNOSIS — Z76.89 ENCOUNTER TO ESTABLISH CARE: Primary | ICD-10-CM

## 2024-01-22 DIAGNOSIS — Z78.0 POST-MENOPAUSE: ICD-10-CM

## 2024-01-22 DIAGNOSIS — N64.52 NIPPLE DISCHARGE: ICD-10-CM

## 2024-01-22 DIAGNOSIS — Z91.89 AT HIGH RISK FOR BREAST CANCER: ICD-10-CM

## 2024-01-22 DIAGNOSIS — Z11.59 NEED FOR HEPATITIS C SCREENING TEST: ICD-10-CM

## 2024-01-22 DIAGNOSIS — Z12.31 BREAST CANCER SCREENING BY MAMMOGRAM: ICD-10-CM

## 2024-01-22 DIAGNOSIS — Z12.11 COLON CANCER SCREENING: ICD-10-CM

## 2024-01-22 DIAGNOSIS — R93.1 AGATSTON CAC SCORE 100-199: ICD-10-CM

## 2024-01-22 PROBLEM — E66.9 OBESITY: Status: ACTIVE | Noted: 2024-01-22

## 2024-01-22 PROBLEM — Z96.641 HISTORY OF TOTAL RIGHT HIP ARTHROPLASTY: Status: ACTIVE | Noted: 2024-01-22

## 2024-01-22 PROBLEM — M17.11 OSTEOARTHRITIS OF RIGHT KNEE: Status: ACTIVE | Noted: 2024-01-22

## 2024-01-22 PROBLEM — K21.9 GASTROESOPHAGEAL REFLUX DISEASE: Status: ACTIVE | Noted: 2024-01-22

## 2024-01-22 PROBLEM — M77.12 LATERAL EPICONDYLITIS OF LEFT ELBOW: Status: ACTIVE | Noted: 2024-01-22

## 2024-01-22 PROBLEM — M60.9 MYOSITIS: Status: ACTIVE | Noted: 2024-01-22

## 2024-01-22 PROCEDURE — 3075F SYST BP GE 130 - 139MM HG: CPT | Mod: CPTII,,, | Performed by: NURSE PRACTITIONER

## 2024-01-22 PROCEDURE — 1160F RVW MEDS BY RX/DR IN RCRD: CPT | Mod: CPTII,,, | Performed by: NURSE PRACTITIONER

## 2024-01-22 PROCEDURE — 99204 OFFICE O/P NEW MOD 45 MIN: CPT | Mod: ,,, | Performed by: NURSE PRACTITIONER

## 2024-01-22 PROCEDURE — 1159F MED LIST DOCD IN RCRD: CPT | Mod: CPTII,,, | Performed by: NURSE PRACTITIONER

## 2024-01-22 PROCEDURE — 3008F BODY MASS INDEX DOCD: CPT | Mod: CPTII,,, | Performed by: NURSE PRACTITIONER

## 2024-01-22 PROCEDURE — 3079F DIAST BP 80-89 MM HG: CPT | Mod: CPTII,,, | Performed by: NURSE PRACTITIONER

## 2024-01-22 RX ORDER — EZETIMIBE 10 MG/1
10 TABLET ORAL DAILY
Qty: 90 TABLET | Refills: 3 | Status: SHIPPED | OUTPATIENT
Start: 2024-01-22 | End: 2025-01-21

## 2024-01-22 NOTE — ASSESSMENT & PLAN NOTE
Has been off of Zetia since about 2019 when her  lost his job and they lost their insurance.  Restart Zetia.  Lipid panel to be completed prior to wellness in about a month.

## 2024-01-22 NOTE — PROGRESS NOTES
Subjective:       Patient ID: Ariela Scott is a 59 y.o. female.    Chief Complaint: Establish Care      HPI   This is a 59-year-old white female who presents to clinic today to reestablish care.  Patient has been lost follow-up for the last 4 years due to losing her insurance.  Has not seen any primary care in this time frame.  No complaints today.  Review of Systems  Comprehensive review of systems negative except as stated in HPI    The patient's Health Maintenance was reviewed and the following appears to be due:   Health Maintenance Due   Topic Date Due    Hepatitis C Screening  Never done    Pneumococcal Vaccines (Age 0-64) (1 - PCV) Never done    TETANUS VACCINE  Never done    Hemoglobin A1c (Diabetic Prevention Screening)  Never done    Shingles Vaccine (1 of 2) Never done    Mammogram  08/23/2020    Colorectal Cancer Screening  02/01/2021    RSV Vaccine (Age 60+ and Pregnant patients) (1 - 1-dose 60+ series) Never done       Past Medical History:  Past Medical History:   Diagnosis Date    Agatston CAC score 100-199     Depression     GERD (gastroesophageal reflux disease)     Hyperlipidemia     Hypertension     Lateral epicondylitis of left elbow     Myositis     Osteoarthritis of right knee      Past Surgical History:   Procedure Laterality Date    ANTERIOR CRUCIATE LIGAMENT REPAIR Left     CARPAL TUNNEL RELEASE Right     CARPAL TUNNEL RELEASE Left     ELBOW SURGERY      HYSTERECTOMY      KNEE CARTILAGE SURGERY Right     TOTAL HIP ARTHROPLASTY      TOTAL KNEE ARTHROPLASTY Right     Partial     Review of patient's allergies indicates:  No Known Allergies  No current outpatient medications on file prior to visit.     No current facility-administered medications on file prior to visit.     Social History     Socioeconomic History    Marital status:    Tobacco Use    Smoking status: Every Day     Types: Vaping with nicotine     Passive exposure: Current    Smokeless tobacco: Never   Substance  "and Sexual Activity    Alcohol use: Not Currently    Drug use: Never    Sexual activity: Yes     Partners: Male     Family History   Problem Relation Age of Onset    Lung cancer Mother     Atrial fibrillation Sister     Breast cancer Sister 62    Atrial fibrillation Sister     Anaya Parkinson White syndrome Brother     Supraventricular tachycardia Son        Objective:       /86 (BP Location: Left arm)   Pulse 87   Temp 98.1 °F (36.7 °C) (Oral)   Resp 16   Ht 5' 5.75" (1.67 m)   Wt 93.3 kg (205 lb 9.6 oz)   SpO2 97%   BMI 33.44 kg/m²      Physical Exam  Vitals and nursing note reviewed.   Constitutional:       Appearance: Normal appearance. She is obese.   HENT:      Head: Normocephalic and atraumatic.      Right Ear: Tympanic membrane, ear canal and external ear normal.      Left Ear: Tympanic membrane, ear canal and external ear normal.      Nose: Nose normal.      Mouth/Throat:      Mouth: Mucous membranes are moist.      Pharynx: Oropharynx is clear.   Eyes:      Extraocular Movements: Extraocular movements intact.      Conjunctiva/sclera: Conjunctivae normal.      Pupils: Pupils are equal, round, and reactive to light.   Cardiovascular:      Rate and Rhythm: Normal rate and regular rhythm.      Heart sounds: Normal heart sounds.   Pulmonary:      Effort: Pulmonary effort is normal.      Breath sounds: Normal breath sounds.   Musculoskeletal:         General: Normal range of motion.      Cervical back: Normal range of motion and neck supple.   Skin:     General: Skin is warm and dry.   Neurological:      General: No focal deficit present.      Mental Status: She is alert and oriented to person, place, and time.   Psychiatric:         Mood and Affect: Mood normal.         Behavior: Behavior normal.         Thought Content: Thought content normal.         Judgment: Judgment normal.         Labs  Office Visit on 01/22/2024   Component Date Value Ref Range Status    CRC Recommendation External " 02/01/2011 Repeat colonoscopy in 10 years   Final       Assessment and Plan       ICD-10-CM ICD-9-CM   1. Encounter to establish care  Z76.89 V65.8   2. Primary hypertension  I10 401.9   3. Thyroid nodule  E04.1 241.0   4. Hyperlipidemia, unspecified hyperlipidemia type  E78.5 272.4   5. Agatston CAC score 100-199  R93.1 793.2   6. Depressive disorder  F32.A 311   7. At high risk for breast cancer  Z91.89 V49.89   8. Post-menopause  Z78.0 V49.81   9. Annual physical exam  Z00.00 V70.0   10. Need for hepatitis C screening test  Z11.59 V73.89   11. Breast cancer screening by mammogram  Z12.31 V76.12   12. Screening for diabetes mellitus  Z13.1 V77.1   13. Colon cancer screening  Z12.11 V76.51        1. Encounter to establish care    2. Primary hypertension  Overview:  Diet controlled    Assessment & Plan:  Blood pressure okay in office today.  136/86.  Did discuss ways to naturally lower blood pressure, will keep an eye on this.  Follow-up in 4-6 weeks for wellness.      3. Thyroid nodule  Overview:  Dr Cortez  Negative FNA 02/11/2019 08/16/2019 - US Thyroid  1.  Stable size of peripherally calcified 1.5 cm left thyroid nodule  with previous nondiagnostic biopsy.  2.  Stable 1 cm right thyroid nodule previously biopsied as benign.  3.  Two newly identified hypoechoic nodules in the left thyroid lobe,  one with poorly defined borders measuring up to 9 mm in size. This can  be followed up with ultrasound.    02/27/2020 - US thyroid - Multinodular goiter not significantly changed. Recommend routine  surveillance.          Assessment & Plan:  Thyroid ultrasound ordered to be completed prior to wellness in 4-6 weeks.    Orders:  -     US Thyroid; Future; Expected date: 01/22/2024    4. Hyperlipidemia, unspecified hyperlipidemia type  Overview:  Simvastatin and pravastatin both caused myalgias    02/15/95780 - coronary artery calcium score 132.4, referred to Dr Boone     03/12/2018 - normal nuclear stress  test    03/15/2018 - TTE with normal LV function. Trace aortic, mitral, and tricuspid regurgitation.    03/29/20218 - Zetia 10 mg daily    Assessment & Plan:  Has been off of Zetia since about 2019 when her  lost his job and they lost their insurance.  Restart Zetia.  Lipid panel to be completed prior to wellness in about a month.    Orders:  -     ezetimibe (ZETIA) 10 mg tablet; Take 1 tablet (10 mg total) by mouth once daily.  Dispense: 90 tablet; Refill: 3    5. Agatston CAC score 100-199  Overview:  02/15/32813 - coronary artery calcium score 132.4, referred to Dr Boone     03/12/2018 - normal nuclear stress test    03/15/2018 - TTE with normal LV function. Trace aortic, mitral, and tricuspid regurgitation.    Assessment & Plan:  Denies any anginal symptoms, will get a repeat CT calcium score maybe at next visit, she had many other tests to complete 1st that I ordered today.  Patient okay with this plan.      6. Depressive disorder  Overview:  08/03/2018 - start Cymbalta 60 mg daily due to depression after hip replacement, stopped Cymbalta 2019    Assessment & Plan:  No longer on Cymbalta, doing well without it.  Denies any depression.      7. At high risk for breast cancer  Overview:  Dr Moraes    37.3 % lifetime risk per Tyrer-Cuzick with personal history of left breast ALH and intraductal papilloma status post excisional biopsy in 2016 at age of 51    02/12/2019 - MRI breast bilateral, normal      Assessment & Plan:  Has not had any imaging since 2019.  Mammogram ordered to be completed at [facility] breast [place], will get follow-up MRI 6 months later.    Orders:  -     Mammo Digital Screening Bilat w/ Mark; Future; Expected date: 01/22/2024    8. Post-menopause  Overview:  Total hysterectomy and bilateral oophorectomy in 2000, took hormones for approximately 10 years    Assessment & Plan:  Baseline DEXA ordered to be completed with mammogram, follow-up in clinic in 4-6  weeks.    Orders:  -     DXA Bone Density Axial Skeleton 1 or more sites; Future; Expected date: 01/22/2024    9. Annual physical exam  Assessment & Plan:  Return to clinic in 4-6 weeks for wellness with labs prior.  Declines vaccinations today, will think about it and discuss at wellness.    Orders:  -     CBC Auto Differential; Future; Expected date: 01/22/2024  -     Comprehensive Metabolic Panel; Future; Expected date: 01/22/2024  -     Lipid Panel; Future; Expected date: 01/22/2024  -     TSH; Future; Expected date: 01/22/2024    10. Need for hepatitis C screening test  -     Hepatitis C Antibody; Future; Expected date: 01/22/2024    11. Breast cancer screening by mammogram  -     Mammo Digital Screening Bilat w/ Mark; Future; Expected date: 01/22/2024    12. Screening for diabetes mellitus  -     Hemoglobin A1C; Future; Expected date: 01/22/2024    13. Colon cancer screening  Overview:  Dr Mendoza  02/01/2011 - colonoscopy, 1 polyp, hyperplastic, repeat 10 years    Assessment & Plan:  Patient overdue for repeat colonoscopy, referral sent to Dr. Mendoza    Orders:  -     Ambulatory referral/consult to Gastroenterology; Future; Expected date: 01/29/2024           Follow up in about 6 weeks (around 3/4/2024) for Annual.

## 2024-01-22 NOTE — ASSESSMENT & PLAN NOTE
Blood pressure okay in office today.  136/86.  Did discuss ways to naturally lower blood pressure, will keep an eye on this.  Follow-up in 4-6 weeks for wellness.

## 2024-01-22 NOTE — ASSESSMENT & PLAN NOTE
Denies any anginal symptoms, will get a repeat CT calcium score maybe at next visit, she had many other tests to complete 1st that I ordered today.  Patient okay with this plan.

## 2024-01-22 NOTE — ASSESSMENT & PLAN NOTE
Return to clinic in 4-6 weeks for wellness with labs prior.  Declines vaccinations today, will think about it and discuss at wellness.

## 2024-01-22 NOTE — ASSESSMENT & PLAN NOTE
Has not had any imaging since 2019.  Mammogram ordered to be completed at [facility] breast [place], will get follow-up MRI 6 months later.

## 2024-01-29 ENCOUNTER — HOSPITAL ENCOUNTER (OUTPATIENT)
Dept: RADIOLOGY | Facility: HOSPITAL | Age: 59
Discharge: HOME | End: 2024-01-29
Attending: NURSE PRACTITIONER
Payer: COMMERCIAL

## 2024-01-29 DIAGNOSIS — Z78.0 POST-MENOPAUSE: ICD-10-CM

## 2024-01-29 DIAGNOSIS — E04.1 THYROID NODULE: ICD-10-CM

## 2024-01-29 DIAGNOSIS — Z12.31 BREAST CANCER SCREENING BY MAMMOGRAM: ICD-10-CM

## 2024-01-29 DIAGNOSIS — Z91.89 AT HIGH RISK FOR BREAST CANCER: ICD-10-CM

## 2024-01-29 PROCEDURE — 77080 DXA BONE DENSITY AXIAL: CPT | Mod: TC

## 2024-01-29 PROCEDURE — 76536 US EXAM OF HEAD AND NECK: CPT | Mod: TC

## 2024-01-30 ENCOUNTER — TELEPHONE (OUTPATIENT)
Dept: FAMILY MEDICINE | Facility: CLINIC | Age: 59
End: 2024-01-30
Payer: COMMERCIAL

## 2024-01-30 NOTE — TELEPHONE ENCOUNTER
----- Message from GIRISH Reynaga sent at 1/30/2024  7:49 AM CST -----  DEXA normal. Repeat 2 years.

## 2024-01-31 ENCOUNTER — LAB VISIT (OUTPATIENT)
Dept: LAB | Facility: HOSPITAL | Age: 59
End: 2024-01-31
Attending: NURSE PRACTITIONER
Payer: COMMERCIAL

## 2024-01-31 DIAGNOSIS — Z11.59 NEED FOR HEPATITIS C SCREENING TEST: ICD-10-CM

## 2024-01-31 DIAGNOSIS — Z00.00 ANNUAL PHYSICAL EXAM: ICD-10-CM

## 2024-01-31 DIAGNOSIS — Z13.1 SCREENING FOR DIABETES MELLITUS: ICD-10-CM

## 2024-01-31 LAB
ALBUMIN SERPL-MCNC: 3.8 G/DL (ref 3.4–4.8)
ALBUMIN/GLOB SERPL: 1.2 RATIO (ref 1.1–2)
ALP SERPL-CCNC: 91 UNIT/L (ref 40–150)
ALT SERPL-CCNC: 17 UNIT/L (ref 0–55)
AST SERPL-CCNC: 15 UNIT/L (ref 5–34)
BASOPHILS # BLD AUTO: 0.05 X10(3)/MCL
BASOPHILS NFR BLD AUTO: 0.7 %
BILIRUB SERPL-MCNC: 1.3 MG/DL
BUN SERPL-MCNC: 8.3 MG/DL (ref 9.8–20.1)
CALCIUM SERPL-MCNC: 9.5 MG/DL (ref 8.4–10.2)
CHLORIDE SERPL-SCNC: 103 MMOL/L (ref 98–107)
CHOLEST SERPL-MCNC: 263 MG/DL
CHOLEST/HDLC SERPL: 3 {RATIO} (ref 0–5)
CO2 SERPL-SCNC: 26 MMOL/L (ref 23–31)
CREAT SERPL-MCNC: 0.74 MG/DL (ref 0.55–1.02)
EOSINOPHIL # BLD AUTO: 0.23 X10(3)/MCL (ref 0–0.9)
EOSINOPHIL NFR BLD AUTO: 3.4 %
ERYTHROCYTE [DISTWIDTH] IN BLOOD BY AUTOMATED COUNT: 13.3 % (ref 11.5–17)
EST. AVERAGE GLUCOSE BLD GHB EST-MCNC: 116.9 MG/DL
GFR SERPLBLD CREATININE-BSD FMLA CKD-EPI: >60 MLS/MIN/1.73/M2
GLOBULIN SER-MCNC: 3.3 GM/DL (ref 2.4–3.5)
GLUCOSE SERPL-MCNC: 99 MG/DL (ref 82–115)
HBA1C MFR BLD: 5.7 %
HCT VFR BLD AUTO: 43.7 % (ref 37–47)
HCV AB SERPL QL IA: NONREACTIVE
HDLC SERPL-MCNC: 80 MG/DL (ref 35–60)
HGB BLD-MCNC: 14.1 G/DL (ref 12–16)
IMM GRANULOCYTES # BLD AUTO: 0.01 X10(3)/MCL (ref 0–0.04)
IMM GRANULOCYTES NFR BLD AUTO: 0.1 %
LDLC SERPL CALC-MCNC: 159 MG/DL (ref 50–140)
LYMPHOCYTES # BLD AUTO: 1.87 X10(3)/MCL (ref 0.6–4.6)
LYMPHOCYTES NFR BLD AUTO: 27.4 %
MCH RBC QN AUTO: 29.3 PG (ref 27–31)
MCHC RBC AUTO-ENTMCNC: 32.3 G/DL (ref 33–36)
MCV RBC AUTO: 90.9 FL (ref 80–94)
MONOCYTES # BLD AUTO: 0.41 X10(3)/MCL (ref 0.1–1.3)
MONOCYTES NFR BLD AUTO: 6 %
NEUTROPHILS # BLD AUTO: 4.25 X10(3)/MCL (ref 2.1–9.2)
NEUTROPHILS NFR BLD AUTO: 62.4 %
PLATELET # BLD AUTO: 309 X10(3)/MCL (ref 130–400)
PMV BLD AUTO: 10.8 FL (ref 7.4–10.4)
POTASSIUM SERPL-SCNC: 4.1 MMOL/L (ref 3.5–5.1)
PROT SERPL-MCNC: 7.1 GM/DL (ref 5.8–7.6)
RBC # BLD AUTO: 4.81 X10(6)/MCL (ref 4.2–5.4)
SODIUM SERPL-SCNC: 139 MMOL/L (ref 136–145)
TRIGL SERPL-MCNC: 121 MG/DL (ref 37–140)
TSH SERPL-ACNC: 1.07 UIU/ML (ref 0.35–4.94)
VLDLC SERPL CALC-MCNC: 24 MG/DL
WBC # SPEC AUTO: 6.82 X10(3)/MCL (ref 4.5–11.5)

## 2024-01-31 PROCEDURE — 84443 ASSAY THYROID STIM HORMONE: CPT

## 2024-01-31 PROCEDURE — 85025 COMPLETE CBC W/AUTO DIFF WBC: CPT

## 2024-01-31 PROCEDURE — 36415 COLL VENOUS BLD VENIPUNCTURE: CPT

## 2024-01-31 PROCEDURE — 80053 COMPREHEN METABOLIC PANEL: CPT

## 2024-01-31 PROCEDURE — 86803 HEPATITIS C AB TEST: CPT

## 2024-01-31 PROCEDURE — 80061 LIPID PANEL: CPT

## 2024-01-31 PROCEDURE — 83036 HEMOGLOBIN GLYCOSYLATED A1C: CPT

## 2024-02-28 ENCOUNTER — TELEPHONE (OUTPATIENT)
Dept: FAMILY MEDICINE | Facility: CLINIC | Age: 59
End: 2024-02-28
Payer: COMMERCIAL

## 2024-02-28 NOTE — TELEPHONE ENCOUNTER
Attempted to contact patient for previsit.  Left message reminding patient about her upcoming visit.  Labs were completed 1/31/24.

## 2024-03-05 ENCOUNTER — HOSPITAL ENCOUNTER (OUTPATIENT)
Dept: RADIOLOGY | Facility: HOSPITAL | Age: 59
Discharge: HOME | End: 2024-03-05
Attending: NURSE PRACTITIONER
Payer: COMMERCIAL

## 2024-03-05 DIAGNOSIS — N64.9 LESION OF LEFT NIPPLE: Primary | ICD-10-CM

## 2024-03-05 DIAGNOSIS — N64.52 NIPPLE DISCHARGE: ICD-10-CM

## 2024-03-05 DIAGNOSIS — Z12.31 BREAST CANCER SCREENING BY MAMMOGRAM: ICD-10-CM

## 2024-03-05 DIAGNOSIS — Z91.89 AT HIGH RISK FOR BREAST CANCER: ICD-10-CM

## 2024-03-05 PROCEDURE — 77062 BREAST TOMOSYNTHESIS BI: CPT | Mod: 26,,, | Performed by: RADIOLOGY

## 2024-03-05 PROCEDURE — 77066 DX MAMMO INCL CAD BI: CPT | Mod: TC

## 2024-03-05 PROCEDURE — 77066 DX MAMMO INCL CAD BI: CPT | Mod: 26,,, | Performed by: RADIOLOGY

## 2024-03-05 PROCEDURE — 76642 ULTRASOUND BREAST LIMITED: CPT | Mod: 26,50,, | Performed by: RADIOLOGY

## 2024-03-05 PROCEDURE — 76642 ULTRASOUND BREAST LIMITED: CPT | Mod: TC,50

## 2024-03-06 ENCOUNTER — OFFICE VISIT (OUTPATIENT)
Dept: FAMILY MEDICINE | Facility: CLINIC | Age: 59
End: 2024-03-06
Payer: COMMERCIAL

## 2024-03-06 ENCOUNTER — PATIENT MESSAGE (OUTPATIENT)
Dept: FAMILY MEDICINE | Facility: CLINIC | Age: 59
End: 2024-03-06

## 2024-03-06 VITALS
TEMPERATURE: 98 F | WEIGHT: 207.19 LBS | HEIGHT: 66 IN | BODY MASS INDEX: 33.3 KG/M2 | OXYGEN SATURATION: 97 % | DIASTOLIC BLOOD PRESSURE: 94 MMHG | RESPIRATION RATE: 16 BRPM | HEART RATE: 69 BPM | SYSTOLIC BLOOD PRESSURE: 142 MMHG

## 2024-03-06 DIAGNOSIS — E78.2 MIXED HYPERLIPIDEMIA: ICD-10-CM

## 2024-03-06 DIAGNOSIS — Z91.89 AT HIGH RISK FOR BREAST CANCER: ICD-10-CM

## 2024-03-06 DIAGNOSIS — Z78.0 POST-MENOPAUSE: ICD-10-CM

## 2024-03-06 DIAGNOSIS — Z12.11 COLON CANCER SCREENING: ICD-10-CM

## 2024-03-06 DIAGNOSIS — I10 PRIMARY HYPERTENSION: ICD-10-CM

## 2024-03-06 DIAGNOSIS — R93.1 AGATSTON CAC SCORE 100-199: ICD-10-CM

## 2024-03-06 DIAGNOSIS — Z00.00 ANNUAL PHYSICAL EXAM: Primary | ICD-10-CM

## 2024-03-06 DIAGNOSIS — Z13.6 ENCOUNTER FOR SCREENING FOR CARDIOVASCULAR DISORDERS: ICD-10-CM

## 2024-03-06 PROCEDURE — 3008F BODY MASS INDEX DOCD: CPT | Mod: CPTII,,, | Performed by: NURSE PRACTITIONER

## 2024-03-06 PROCEDURE — 1160F RVW MEDS BY RX/DR IN RCRD: CPT | Mod: CPTII,,, | Performed by: NURSE PRACTITIONER

## 2024-03-06 PROCEDURE — 99396 PREV VISIT EST AGE 40-64: CPT | Mod: ,,, | Performed by: NURSE PRACTITIONER

## 2024-03-06 PROCEDURE — 1159F MED LIST DOCD IN RCRD: CPT | Mod: CPTII,,, | Performed by: NURSE PRACTITIONER

## 2024-03-06 PROCEDURE — 3044F HG A1C LEVEL LT 7.0%: CPT | Mod: CPTII,,, | Performed by: NURSE PRACTITIONER

## 2024-03-06 PROCEDURE — 3080F DIAST BP >= 90 MM HG: CPT | Mod: CPTII,,, | Performed by: NURSE PRACTITIONER

## 2024-03-06 PROCEDURE — 3077F SYST BP >= 140 MM HG: CPT | Mod: CPTII,,, | Performed by: NURSE PRACTITIONER

## 2024-03-06 RX ORDER — AMLODIPINE BESYLATE 5 MG/1
5 TABLET ORAL DAILY
Qty: 90 TABLET | Refills: 3 | Status: SHIPPED | OUTPATIENT
Start: 2024-03-06 | End: 2025-03-06

## 2024-03-06 NOTE — PROGRESS NOTES
Subjective:       Patient ID: Ariela Scott is a 59 y.o. female.    Chief Complaint: Annual Exam      HPI    This is a 59-year-old white female who presents to clinic today for an annual wellness exam.  Patient reports overall she is doing well.  She had abnormal mammogram, has an appointment with Dr. Moraes tomorrow.  Colonoscopy scheduled for next week.  Review of Systems  Comprehensive review of systems negative except as stated in HPI    The patient's Health Maintenance was reviewed and the following appears to be due:   Health Maintenance Due   Topic Date Due    Colorectal Cancer Screening  02/01/2021       Past Medical History:  Past Medical History:   Diagnosis Date    Agatston CAC score 100-199     Depression     GERD (gastroesophageal reflux disease)     Hyperlipidemia     Hypertension     Lateral epicondylitis of left elbow     Myositis     Osteoarthritis of right knee      Past Surgical History:   Procedure Laterality Date    ANTERIOR CRUCIATE LIGAMENT REPAIR Left     CARPAL TUNNEL RELEASE Right     CARPAL TUNNEL RELEASE Left     ELBOW SURGERY      HYSTERECTOMY      KNEE CARTILAGE SURGERY Right     TOTAL HIP ARTHROPLASTY      TOTAL KNEE ARTHROPLASTY Right     Partial     Review of patient's allergies indicates:  No Known Allergies  Current Outpatient Medications on File Prior to Visit   Medication Sig Dispense Refill    ezetimibe (ZETIA) 10 mg tablet Take 1 tablet (10 mg total) by mouth once daily. 90 tablet 3     No current facility-administered medications on file prior to visit.     Social History     Socioeconomic History    Marital status:    Tobacco Use    Smoking status: Every Day     Types: Vaping with nicotine     Passive exposure: Current    Smokeless tobacco: Never   Substance and Sexual Activity    Alcohol use: Not Currently    Drug use: Never    Sexual activity: Yes     Partners: Male     Family History   Problem Relation Age of Onset    Lung cancer Mother     Atrial  "fibrillation Sister     Breast cancer Sister 62    Atrial fibrillation Sister     Anaya Parkinson White syndrome Brother     Supraventricular tachycardia Son        Objective:       BP (!) 142/94   Pulse 69   Temp 98.1 °F (36.7 °C) (Oral)   Resp 16   Ht 5' 5.75" (1.67 m)   Wt 94 kg (207 lb 3.2 oz)   SpO2 97%   BMI 33.70 kg/m²      Physical Exam  Vitals and nursing note reviewed.   Constitutional:       Appearance: Normal appearance. She is normal weight.   HENT:      Head: Normocephalic and atraumatic.      Right Ear: Tympanic membrane, ear canal and external ear normal.      Left Ear: Tympanic membrane, ear canal and external ear normal.      Nose: Nose normal.      Mouth/Throat:      Mouth: Mucous membranes are moist.      Pharynx: Oropharynx is clear.   Eyes:      Extraocular Movements: Extraocular movements intact.      Conjunctiva/sclera: Conjunctivae normal.      Pupils: Pupils are equal, round, and reactive to light.   Cardiovascular:      Rate and Rhythm: Normal rate and regular rhythm.      Heart sounds: Normal heart sounds.   Pulmonary:      Effort: Pulmonary effort is normal.      Breath sounds: Normal breath sounds.   Chest:   Breasts:     Left: Skin change present.          Comments:   Left areolar skin thickening noted  Abdominal:      General: Abdomen is flat. Bowel sounds are normal.      Palpations: Abdomen is soft.   Musculoskeletal:         General: Normal range of motion.      Cervical back: Normal range of motion and neck supple.   Skin:     General: Skin is warm and dry.   Neurological:      General: No focal deficit present.      Mental Status: She is alert and oriented to person, place, and time.   Psychiatric:         Mood and Affect: Mood normal.         Behavior: Behavior normal.         Thought Content: Thought content normal.         Judgment: Judgment normal.         Labs  Lab Visit on 01/31/2024   Component Date Value Ref Range Status    Sodium Level 01/31/2024 139  136 - 145 " mmol/L Final    Potassium Level 01/31/2024 4.1  3.5 - 5.1 mmol/L Final    Chloride 01/31/2024 103  98 - 107 mmol/L Final    Carbon Dioxide 01/31/2024 26  23 - 31 mmol/L Final    Glucose Level 01/31/2024 99  82 - 115 mg/dL Final    Blood Urea Nitrogen 01/31/2024 8.3 (L)  9.8 - 20.1 mg/dL Final    Creatinine 01/31/2024 0.74  0.55 - 1.02 mg/dL Final    Calcium Level Total 01/31/2024 9.5  8.4 - 10.2 mg/dL Final    Protein Total 01/31/2024 7.1  5.8 - 7.6 gm/dL Final    Albumin Level 01/31/2024 3.8  3.4 - 4.8 g/dL Final    Globulin 01/31/2024 3.3  2.4 - 3.5 gm/dL Final    Albumin/Globulin Ratio 01/31/2024 1.2  1.1 - 2.0 ratio Final    Bilirubin Total 01/31/2024 1.3  <=1.5 mg/dL Final    Alkaline Phosphatase 01/31/2024 91  40 - 150 unit/L Final    Alanine Aminotransferase 01/31/2024 17  0 - 55 unit/L Final    Aspartate Aminotransferase 01/31/2024 15  5 - 34 unit/L Final    eGFR 01/31/2024 >60  mls/min/1.73/m2 Final    Cholesterol Total 01/31/2024 263 (H)  <=200 mg/dL Final    HDL Cholesterol 01/31/2024 80 (H)  35 - 60 mg/dL Final    Triglyceride 01/31/2024 121  37 - 140 mg/dL Final    Cholesterol/HDL Ratio 01/31/2024 3  0 - 5 Final    Very Low Density Lipoprotein 01/31/2024 24   Final    LDL Cholesterol 01/31/2024 159.00 (H)  50.00 - 140.00 mg/dL Final    TSH 01/31/2024 1.070  0.350 - 4.940 uIU/mL Final    Hep C Ab Interp 01/31/2024 Nonreactive  Nonreactive Final    Hemoglobin A1c 01/31/2024 5.7  <=7.0 % Final    Estimated Average Glucose 01/31/2024 116.9  mg/dL Final    WBC 01/31/2024 6.82  4.50 - 11.50 x10(3)/mcL Final    RBC 01/31/2024 4.81  4.20 - 5.40 x10(6)/mcL Final    Hgb 01/31/2024 14.1  12.0 - 16.0 g/dL Final    Hct 01/31/2024 43.7  37.0 - 47.0 % Final    MCV 01/31/2024 90.9  80.0 - 94.0 fL Final    MCH 01/31/2024 29.3  27.0 - 31.0 pg Final    MCHC 01/31/2024 32.3 (L)  33.0 - 36.0 g/dL Final    RDW 01/31/2024 13.3  11.5 - 17.0 % Final    Platelet 01/31/2024 309  130 - 400 x10(3)/mcL Final    MPV 01/31/2024 10.8  (H)  7.4 - 10.4 fL Final    Neut % 01/31/2024 62.4  % Final    Lymph % 01/31/2024 27.4  % Final    Mono % 01/31/2024 6.0  % Final    Eos % 01/31/2024 3.4  % Final    Basophil % 01/31/2024 0.7  % Final    Lymph # 01/31/2024 1.87  0.6 - 4.6 x10(3)/mcL Final    Neut # 01/31/2024 4.25  2.1 - 9.2 x10(3)/mcL Final    Mono # 01/31/2024 0.41  0.1 - 1.3 x10(3)/mcL Final    Eos # 01/31/2024 0.23  0 - 0.9 x10(3)/mcL Final    Baso # 01/31/2024 0.05  <=0.2 x10(3)/mcL Final    IG# 01/31/2024 0.01  0 - 0.04 x10(3)/mcL Final    IG% 01/31/2024 0.1  % Final   Office Visit on 01/22/2024   Component Date Value Ref Range Status    CRC Recommendation External 02/01/2011 Repeat colonoscopy in 10 years   Final       Assessment and Plan       ICD-10-CM ICD-9-CM   1. Annual physical exam  Z00.00 V70.0   2. Mixed hyperlipidemia  E78.2 272.2   3. Primary hypertension  I10 401.9   4. At high risk for breast cancer  Z91.89 V49.89   5. Post-menopause  Z78.0 V49.81   6. Agatston CAC score 100-199  R93.1 793.2   7. Colon cancer screening  Z12.11 V76.51   8. Encounter for screening for cardiovascular disorders  Z13.6 V81.2        1. Annual physical exam  Overview:    Annual wellness exam yearly in March      2. Mixed hyperlipidemia  Overview:  Simvastatin and pravastatin both caused myalgias    02/15/55069 - coronary artery calcium score 132.4, referred to Dr Boone     03/12/2018 - normal nuclear stress test    03/15/2018 - TTE with normal LV function. Trace aortic, mitral, and tricuspid regurgitation.    03/29/20218 - Zetia 10 mg daily    Assessment & Plan:    Total cholesterol 263, , continue Zetia 10 mg daily, follow-up 6 months with repeat lipid.    Orders:  -     Lipid Panel; Future; Expected date: 09/07/2024  -     Comprehensive Metabolic Panel; Future; Expected date: 09/07/2024    3. Primary hypertension  Overview:  03/06/2024 - amlodipine 5 mg daily    Assessment & Plan:  Start amlodipine 5 mg daily, monitor blood pressure at home,  Marlene message in 3 weeks for home blood pressure follow-up.    Orders:  -     amLODIPine (NORVASC) 5 MG tablet; Take 1 tablet (5 mg total) by mouth once daily.  Dispense: 90 tablet; Refill: 3    4. At high risk for breast cancer  Overview:  Dr Moraes    37.3 % lifetime risk per Claudia with personal history of left breast ALH and intraductal papilloma status post excisional biopsy in 2016 at age of 51    02/12/2019 - MRI breast bilateral, normal    03/05/2024 - diagnostic MMG and US - Focal skin thickening up to 4 mm in the 9:00 subareolar left breast in the area of concern. Refer back to Dr Moraes       Assessment & Plan:   Patient has appointment with Dr. Moraes for evaluation on 03/07/2024.  Will get breast MRI in 6 months as well.    Orders:  -     MRI Breast Bilateral W WO Contrast; Future; Expected date: 09/07/2024    5. Post-menopause  Overview:  Total hysterectomy and bilateral oophorectomy in 2000, took hormones for approximately 10 years    DEXA every 2 years at [facility] (Next due 01/29/2026)  Normal BMD        Assessment & Plan:    Recent DEXA normal, repeat 2 years.      6. Agatston CAC score 100-199  Overview:  02/15/78866 - coronary artery calcium score 132.4, referred to Dr Boone     03/12/2018 - normal nuclear stress test    03/15/2018 - TTE with normal LV function. Trace aortic, mitral, and tricuspid regurgitation.    Assessment & Plan:  Will get repeat coronary artery calcium score in 6 months.      7. Colon cancer screening  Overview:  Dr Mendoza  02/01/2011 - colonoscopy, 1 polyp, hyperplastic, repeat 10 years    Assessment & Plan:    Colonoscopy scheduled for next week.      8. Encounter for screening for cardiovascular disorders  -     CT Calcium Scoring Cardiac; Future; Expected date: 09/07/2024           Follow up in about 6 months (around 9/6/2024) for follow up.

## 2024-03-07 NOTE — ASSESSMENT & PLAN NOTE
Patient has appointment with Dr. Moraes for evaluation on 03/07/2024.  Will get breast MRI in 6 months as well.

## 2024-03-07 NOTE — ASSESSMENT & PLAN NOTE
Start amlodipine 5 mg daily, monitor blood pressure at home, MyChart message in 3 weeks for home blood pressure follow-up.

## 2024-03-12 ENCOUNTER — DOCUMENTATION ONLY (OUTPATIENT)
Dept: FAMILY MEDICINE | Facility: CLINIC | Age: 59
End: 2024-03-12
Payer: COMMERCIAL

## 2024-03-12 LAB — CRC RECOMMENDATION EXT: NORMAL

## 2024-04-01 VITALS — SYSTOLIC BLOOD PRESSURE: 132 MMHG | DIASTOLIC BLOOD PRESSURE: 81 MMHG

## 2024-06-10 PROBLEM — Z00.00 ANNUAL PHYSICAL EXAM: Status: RESOLVED | Noted: 2024-01-22 | Resolved: 2024-06-10

## 2024-06-24 ENCOUNTER — HOSPITAL ENCOUNTER (OUTPATIENT)
Dept: RADIOLOGY | Facility: CLINIC | Age: 59
Discharge: HOME | End: 2024-06-24
Attending: REHABILITATION UNIT
Payer: COMMERCIAL

## 2024-06-24 ENCOUNTER — OFFICE VISIT (OUTPATIENT)
Dept: ORTHOPEDICS | Facility: CLINIC | Age: 59
End: 2024-06-24
Payer: COMMERCIAL

## 2024-06-24 VITALS
SYSTOLIC BLOOD PRESSURE: 117 MMHG | HEIGHT: 65 IN | HEART RATE: 83 BPM | WEIGHT: 207.88 LBS | BODY MASS INDEX: 34.63 KG/M2 | DIASTOLIC BLOOD PRESSURE: 76 MMHG

## 2024-06-24 DIAGNOSIS — M25.562 LEFT KNEE PAIN, UNSPECIFIED CHRONICITY: ICD-10-CM

## 2024-06-24 DIAGNOSIS — M25.562 ACUTE PAIN OF LEFT KNEE: Primary | ICD-10-CM

## 2024-06-24 PROCEDURE — 3044F HG A1C LEVEL LT 7.0%: CPT | Mod: CPTII,,, | Performed by: REHABILITATION UNIT

## 2024-06-24 PROCEDURE — 73564 X-RAY EXAM KNEE 4 OR MORE: CPT | Mod: LT,,, | Performed by: REHABILITATION UNIT

## 2024-06-24 PROCEDURE — 20610 DRAIN/INJ JOINT/BURSA W/O US: CPT | Mod: LT,,, | Performed by: REHABILITATION UNIT

## 2024-06-24 PROCEDURE — 3008F BODY MASS INDEX DOCD: CPT | Mod: CPTII,,, | Performed by: REHABILITATION UNIT

## 2024-06-24 PROCEDURE — 3078F DIAST BP <80 MM HG: CPT | Mod: CPTII,,, | Performed by: REHABILITATION UNIT

## 2024-06-24 PROCEDURE — 1159F MED LIST DOCD IN RCRD: CPT | Mod: CPTII,,, | Performed by: REHABILITATION UNIT

## 2024-06-24 PROCEDURE — 3074F SYST BP LT 130 MM HG: CPT | Mod: CPTII,,, | Performed by: REHABILITATION UNIT

## 2024-06-24 PROCEDURE — 99203 OFFICE O/P NEW LOW 30 MIN: CPT | Mod: 25,,, | Performed by: REHABILITATION UNIT

## 2024-06-24 RX ORDER — BETAMETHASONE SODIUM PHOSPHATE AND BETAMETHASONE ACETATE 3; 3 MG/ML; MG/ML
12 INJECTION, SUSPENSION INTRA-ARTICULAR; INTRALESIONAL; INTRAMUSCULAR; SOFT TISSUE
Status: DISCONTINUED | OUTPATIENT
Start: 2024-06-24 | End: 2024-06-24 | Stop reason: HOSPADM

## 2024-06-24 RX ORDER — LIDOCAINE HYDROCHLORIDE 20 MG/ML
40 INJECTION, SOLUTION INFILTRATION; PERINEURAL
Status: DISCONTINUED | OUTPATIENT
Start: 2024-06-24 | End: 2024-06-24 | Stop reason: HOSPADM

## 2024-06-24 RX ADMIN — LIDOCAINE HYDROCHLORIDE 40 MG: 20 INJECTION, SOLUTION INFILTRATION; PERINEURAL at 01:06

## 2024-06-24 RX ADMIN — BETAMETHASONE SODIUM PHOSPHATE AND BETAMETHASONE ACETATE 12 MG: 3; 3 INJECTION, SUSPENSION INTRA-ARTICULAR; INTRALESIONAL; INTRAMUSCULAR; SOFT TISSUE at 01:06

## 2024-06-24 NOTE — PROGRESS NOTES
Subjective:      Patient ID: Ariela Scott is a 59 y.o. female.    Chief Complaint: Pain of the Left Knee (Reports knee locks up at times and feels unstable. Stated has traveling pain from knee up and down into hip and ankle. Stated knee swells at times. Ongoing pain for about a month was dancing and started feeling it then. Is taking tylenol but does not help too much with pain. )    HPI:   Ariela Scott is a 59 y.o. female who presents today for initial evaluation of her left knee    History of Present Illness  The patient presents for evaluation of left knee pain.    The patient has been experiencing discomfort in her left knee for approximately one month, which she attributes to a prolonged dancing session with her daughter. The following morning, she experienced swelling and pain, characterized by locking, burning, stinging sensations, and radiating pain upwards. She is uncertain if these symptoms are related to her hip. She denies any specific injury to the knee and has not sought medical attention or initiated any pharmacological interventions since the onset of these symptoms. She also reports pain in the ball of her foot, which radiates into her toes, which she suspects may be due to overcompensation. She expresses a desire to consult a podiatrist. Her knee occasionally feels unstable and locks up, particularly when descending stairs, for which she utilizes hand railings.  She reports a distant history of ACL reconstruction onto left knee with Dr. Lynne.  She has also had a right medial unicompartmental arthroplasty and hip replacement with Dr. 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      Past Medical History:   Diagnosis Date    [facility] CAC score 100-199     Depression     GERD (gastroesophageal reflux disease)     Hyperlipidemia     Hypertension     Lateral epicondylitis of left elbow     Myositis     Osteoarthritis of right knee      Past Surgical History:  "  Procedure Laterality Date    ANTERIOR CRUCIATE LIGAMENT REPAIR Left     CARPAL TUNNEL RELEASE Right     CARPAL TUNNEL RELEASE Left     ELBOW SURGERY      HYSTERECTOMY      KNEE CARTILAGE SURGERY Right     TOTAL HIP ARTHROPLASTY      TOTAL KNEE ARTHROPLASTY Right     Partial     Social History     Socioeconomic History    Marital status:    Tobacco Use    Smoking status: Every Day     Types: Vaping with nicotine     Passive exposure: Current    Smokeless tobacco: Never   Substance and Sexual Activity    Alcohol use: Not Currently    Drug use: Never    Sexual activity: Yes     Partners: Male         Current Outpatient Medications:     amLODIPine (NORVASC) 5 MG tablet, Take 1 tablet (5 mg total) by mouth once daily., Disp: 90 tablet, Rfl: 3    ezetimibe (ZETIA) 10 mg tablet, Take 1 tablet (10 mg total) by mouth once daily., Disp: 90 tablet, Rfl: 3  Review of patient's allergies indicates:  No Known Allergies    /76 (BP Location: Left arm, Patient Position: Sitting, BP Method: Large (Automatic))   Pulse 83   Ht 5' 5" (1.651 m)   Wt 94.3 kg (207 lb 14.3 oz)   BMI 34.60 kg/m²     Comprehensive review of systems completed and negative except as per HPI.        Objective:   Head: Normocephalic, without obvious abnormality, atraumatic  Eyes: conjunctivae/corneas clear. EOM's intact  Ears: normal external appearance  Nose: Nares normal. Septum midline. Mucosa normal. No drainage  Throat: normal findings: lips normal without lesions  Lungs: unlabored breathing on room air  Chest wall: symmetric chest rise  Heart: regular rate and rhythm  Pulses: 2+ and symmetric  Skin: Skin color, texture, turgor normal. No rashes or lesions  Neurologic: Grossly normal    left KNEE:     Alignment: neutral  Appearance: skin is intact without lesion well-healed surgical incision  Effusion: mild  Gait: antalgic  Ankle ROM: full and painless   Knee ROM:  3 - 115  Tenderness: TTP diffusely  Patellar Mobility: normal  Patellar " grind: negative  PF Crepitus: +      Mel Test: + medially   Valgus Stress @ 0 deg: stable  Valgus Stress @ 30 deg: stable  Varus Stress @ 0 deg: stable  Varus Stress @ 30 deg: stable  Lachman: stable  Ant Drawer: negative   Post Drawer: negative  Posterior Sag Sign: negative  Neurological deficits: SILT dp/sp/t distributions  Motor: 5/5 EHL/FHL/TA/GS    Warm well perfused lower extremity with capillary refill less than 2 seconds and sensation intact to light touch in the terminal nerve distributions. Calf soft and easily compressible without clinical sign of DVT. No palpable popliteal lymphadenopathy    Assessment:     Imaging:   Results  Imaging  X-ray of the left knee shows mild degenerative changes most severe in the medial compartment with tunnels from prior ACL surgery.  Joint space narrowing and surrounding osteophyte formation most severe in the patellofemoral compartment with medial compartment involved as well.     Large Joint Aspiration/Injection: L knee    Date/Time: 6/24/2024 1:30 PM    Performed by: Rakesh Quigley MD  Authorized by: Rakesh Quigley MD    Consent Done?:  Yes (Verbal)  Indications:  Arthritis and pain  Site marked: the procedure site was marked    Timeout: prior to procedure the correct patient, procedure, and site was verified    Prep: patient was prepped and draped in usual sterile fashion    Local anesthesia used?: No      Details:  Needle Size:  22 G  Ultrasonic Guidance for needle placement?: No    Approach:  Anterolateral  Location:  Knee  Site:  L knee  Medications:  40 mg LIDOcaine HCL 20 mg/ml (2%) 20 mg/mL (2 %); 12 mg betamethasone acetate-betamethasone sodium phosphate 6 mg/mL  Patient tolerance:  Patient tolerated the procedure well with no immediate complications            1. Acute pain of left knee          Plan:       Orders Placed This Encounter    Large Joint Aspiration/Injection    X-Ray Knee Complete 4 or More Views Left        Assessment & Plan  1. Pain in  the left knee.  The patient's symptoms, which include catching and locking, suggest a potential meniscal tear. A discussion was held with the patient regarding potential treatment strategies, including the administration of a steroid injection to alleviate pain and swelling.  We will also start some physical therapy at [facility].  Should there be no improvement in the patient's range of motion or further mechanical symptoms, an MRI will be ordered.  I will see her back in 6 weeks for repeat clinical evaluation.  Rice encouraged.  Over-the-counter medicines as needed and medically able.  Avoid aggravating activities.  Questions were answered and she was in agreement.    This note was generated with the assistance of ambient listening technology. Verbal consent was obtained by the patient and accompanying visitor(s) for the recording of patient appointment to facilitate this note. I attest to having reviewed and edited the generated note for accuracy, though some syntax or spelling errors may persist. Please contact the author of this note for any clarification.

## 2024-07-29 ENCOUNTER — TELEPHONE (OUTPATIENT)
Dept: FAMILY MEDICINE | Facility: CLINIC | Age: 59
End: 2024-07-29
Payer: COMMERCIAL

## 2024-07-29 DIAGNOSIS — Z91.89 AT HIGH RISK FOR BREAST CANCER: Primary | ICD-10-CM

## 2024-07-29 DIAGNOSIS — Z12.39 BREAST CANCER SCREENING, HIGH RISK PATIENT: ICD-10-CM

## 2024-07-29 NOTE — TELEPHONE ENCOUNTER
"----- Message from My Flores MA sent at 7/26/2024  9:40 AM CDT -----  Regarding: FW: in correct orders    ----- Message -----  From: Sally Laughlin  Sent: 7/26/2024   9:38 AM CDT  To: [facility] Staff  Subject: in correct orders                                Good morning , the attached pt, has incorrect orders her breast mri is missing the "w/ CAD" at the end. The locations will not perform the test without it. Could the orders please be corrected.  "

## 2024-07-31 ENCOUNTER — HOSPITAL ENCOUNTER (OUTPATIENT)
Dept: RADIOLOGY | Facility: HOSPITAL | Age: 59
Discharge: HOME | End: 2024-07-31
Attending: NURSE PRACTITIONER
Payer: COMMERCIAL

## 2024-07-31 DIAGNOSIS — Z13.6 ENCOUNTER FOR SCREENING FOR CARDIOVASCULAR DISORDERS: ICD-10-CM

## 2024-07-31 PROCEDURE — 75571 CT HRT W/O DYE W/CA TEST: CPT | Mod: TC

## 2024-08-05 DIAGNOSIS — R93.1 AGATSTON CAC SCORE 100-199: Primary | ICD-10-CM

## 2024-08-13 ENCOUNTER — TELEPHONE (OUTPATIENT)
Dept: FAMILY MEDICINE | Facility: CLINIC | Age: 59
End: 2024-08-13
Payer: COMMERCIAL

## 2024-08-13 NOTE — TELEPHONE ENCOUNTER
----- Message from Jaylin You sent at 8/13/2024 11:24 AM CDT -----  Who Called: Ariela Jasonmylesranjeet Scott    What order is the patient requesting: Orders for a nuclear stress test   When does the expect the orders to be performed? [facility]     Preferred Method of Contact: Phone Call  Patient's Preferred Phone Number on File: [phone]   Best Call Back Number, if different:  Additional Information: Ariela is requesting a call back.  She stated she need orders for a nuclear stress test.

## 2024-08-19 ENCOUNTER — OFFICE VISIT (OUTPATIENT)
Dept: ORTHOPEDICS | Facility: CLINIC | Age: 59
End: 2024-08-19
Payer: COMMERCIAL

## 2024-08-19 VITALS
WEIGHT: 208.56 LBS | DIASTOLIC BLOOD PRESSURE: 83 MMHG | HEART RATE: 68 BPM | BODY MASS INDEX: 34.75 KG/M2 | SYSTOLIC BLOOD PRESSURE: 139 MMHG | HEIGHT: 65 IN

## 2024-08-19 DIAGNOSIS — M25.562 ACUTE PAIN OF LEFT KNEE: Primary | ICD-10-CM

## 2024-08-19 PROCEDURE — 1159F MED LIST DOCD IN RCRD: CPT | Mod: CPTII,,, | Performed by: REHABILITATION UNIT

## 2024-08-19 PROCEDURE — 99213 OFFICE O/P EST LOW 20 MIN: CPT | Mod: ,,, | Performed by: REHABILITATION UNIT

## 2024-08-19 PROCEDURE — 3079F DIAST BP 80-89 MM HG: CPT | Mod: CPTII,,, | Performed by: REHABILITATION UNIT

## 2024-08-19 PROCEDURE — 3008F BODY MASS INDEX DOCD: CPT | Mod: CPTII,,, | Performed by: REHABILITATION UNIT

## 2024-08-19 PROCEDURE — 3075F SYST BP GE 130 - 139MM HG: CPT | Mod: CPTII,,, | Performed by: REHABILITATION UNIT

## 2024-08-19 PROCEDURE — 3044F HG A1C LEVEL LT 7.0%: CPT | Mod: CPTII,,, | Performed by: REHABILITATION UNIT

## 2024-08-19 RX ORDER — ASPIRIN 81 MG/1
81 TABLET ORAL DAILY
COMMUNITY

## 2024-08-19 NOTE — PROGRESS NOTES
Subjective:      Patient ID: Ariela Scott is a 59 y.o. female.    Chief Complaint: Follow-up of the Left Knee (8 wks Last injection 6/24/24- Reports a pulling sensation behind knee and pain is off and on. Stated has brett knee pain today. Has some swelling in brett knee and rt groin area is tender to touch as well. Stated has a cardio appt. Wednesday. Injection has helped some and is taking tylenol which helps with some of the pain. Stated knee has locked up on vacation due to walking the entire time. )    HPI:   Ariela Scott is a 59 y.o. female who presents today for f/u evaluation of her left knee    History of Present Illness  The patient presents for evaluation of left knee pain.  She was last seen around 8 weeks ago.  Injection provided some improvement in her pain.  She has right groin pain and pain to her right foot today.  She continues to have some left knee discomfort and occasionally mechanical symptoms.  She is undergoing workup for cardiac complaints currently.  No instability.  No sensory or motor changes distally.    Initial HPI:  The patient has been experiencing discomfort in her left knee for approximately one month, which she attributes to a prolonged dancing session with her daughter. The following morning, she experienced swelling and pain, characterized by locking, burning, stinging sensations, and radiating pain upwards. She is uncertain if these symptoms are related to her hip. She denies any specific injury to the knee and has not sought medical attention or initiated any pharmacological interventions since the onset of these symptoms. She also reports pain in the ball of her foot, which radiates into her toes, which she suspects may be due to overcompensation. She expresses a desire to consult a podiatrist. Her knee occasionally feels unstable and locks up, particularly when descending stairs, for which she utilizes hand railings.  She reports a distant history of ACL  "reconstruction onto left knee with Dr. Lynne.  She has also had a right medial unicompartmental arthroplasty and hip replacement with Dr. 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      Past Medical History:   Diagnosis Date    [facility] CAC score 100-199     Depression     GERD (gastroesophageal reflux disease)     Hyperlipidemia     Hypertension     Lateral epicondylitis of left elbow     Myositis     Osteoarthritis of right knee      Past Surgical History:   Procedure Laterality Date    ANTERIOR CRUCIATE LIGAMENT REPAIR Left     CARPAL TUNNEL RELEASE Right     CARPAL TUNNEL RELEASE Left     ELBOW SURGERY      HYSTERECTOMY      KNEE CARTILAGE SURGERY Right     TOTAL HIP ARTHROPLASTY      TOTAL KNEE ARTHROPLASTY Right     Partial     Social History     Socioeconomic History    Marital status:    Tobacco Use    Smoking status: Every Day     Types: Vaping with nicotine     Passive exposure: Current    Smokeless tobacco: Never   Substance and Sexual Activity    Alcohol use: Not Currently    Drug use: Never    Sexual activity: Yes     Partners: Male         Current Outpatient Medications:     amLODIPine (NORVASC) 5 MG tablet, Take 1 tablet (5 mg total) by mouth once daily., Disp: 90 tablet, Rfl: 3    aspirin (ECOTRIN) 81 MG EC tablet, Take 81 mg by mouth once daily., Disp: , Rfl:     ezetimibe (ZETIA) 10 mg tablet, Take 1 tablet (10 mg total) by mouth once daily., Disp: 90 tablet, Rfl: 3  Review of patient's allergies indicates:  No Known Allergies    /83   Pulse 68   Ht 5' 5" (1.651 m)   Wt 94.6 kg (208 lb 8.9 oz)   BMI 34.71 kg/m²     Comprehensive review of systems completed and negative except as per HPI.        Objective:   Head: Normocephalic, without obvious abnormality, atraumatic  Eyes: conjunctivae/corneas clear. EOM's intact  Ears: normal external appearance  Nose: Nares normal. Septum midline. Mucosa normal. No drainage  Throat: normal findings: lips normal without " lesions  Lungs: unlabored breathing on room air  Chest wall: symmetric chest rise  Heart: regular rate and rhythm  Pulses: 2+ and symmetric  Skin: Skin color, texture, turgor normal. No rashes or lesions  Neurologic: Grossly normal    left KNEE:     Alignment: neutral  Appearance: skin is intact without lesion well-healed surgical incision  Effusion: mild  Gait: antalgic  Ankle ROM: full and painless   Knee ROM:  3 - 115  Tenderness: TTP diffusely  Patellar Mobility: normal  Patellar grind: negative  PF Crepitus: +      Mel Test: + medially   Valgus Stress @ 0 deg: stable  Valgus Stress @ 30 deg: stable  Varus Stress @ 0 deg: stable  Varus Stress @ 30 deg: stable  Lachman: stable  Ant Drawer: negative   Post Drawer: negative  Posterior Sag Sign: negative  Neurological deficits: SILT dp/sp/t distributions  Motor: 5/5 EHL/FHL/TA/GS    Warm well perfused lower extremity with capillary refill less than 2 seconds and sensation intact to light touch in the terminal nerve distributions. Calf soft and easily compressible without clinical sign of DVT. No palpable popliteal lymphadenopathy    Assessment:     Imaging:   Results  Imaging  X-ray of the left knee shows mild degenerative changes most severe in the medial compartment with tunnels from prior ACL surgery.  Joint space narrowing and surrounding osteophyte formation most severe in the patellofemoral compartment with medial compartment involved as well.               1. Acute pain of left knee            Plan:               Assessment & Plan  1. Pain in the left knee.  Imaging and exam findings discussed.  Steroid injection provided temporary relief.  She was undergoing a cardiac workup so according to hold off on further workup at this time.  Referral for physical therapy in [place].  If she has continued pain and discomfort and mechanical symptoms she is going to call and next step is an MRI of her left knee.   Zheng encouraged.  Over-the-counter medicines  as needed and medically able.  Avoid aggravating activities.  Questions were answered and she was in agreement.    This note was generated with the assistance of ambient listening technology. Verbal consent was obtained by the patient and accompanying visitor(s) for the recording of patient appointment to facilitate this note. I attest to having reviewed and edited the generated note for accuracy, though some syntax or spelling errors may persist. Please contact the author of this note for any clarification.          well developed

## 2024-08-23 ENCOUNTER — TELEPHONE (OUTPATIENT)
Dept: FAMILY MEDICINE | Facility: CLINIC | Age: 59
End: 2024-08-23
Payer: COMMERCIAL

## 2024-08-23 NOTE — TELEPHONE ENCOUNTER
----- Message from Miguel Brizuela sent at 8/22/2024  4:34 PM CDT -----  .Type:  Patient Returning Call    Who Called:pt   Who Left Message for Patient:  Does the patient know what this is regarding?:states that the [facility] rep told her that they need a signed imaging order so it can be scheduled . Fax [phone]  Would the patient rather a call back or a response via [facility]? Call back   Best Call Back Number:[phone]  Additional Information:

## 2024-09-03 ENCOUNTER — TELEPHONE (OUTPATIENT)
Dept: FAMILY MEDICINE | Facility: CLINIC | Age: 59
End: 2024-09-03
Payer: COMMERCIAL

## 2024-09-03 DIAGNOSIS — F41.8 SITUATIONAL ANXIETY: Primary | ICD-10-CM

## 2024-09-03 RX ORDER — DIAZEPAM 10 MG/1
10 TABLET ORAL ONCE
Qty: 1 TABLET | Refills: 0 | Status: SHIPPED | OUTPATIENT
Start: 2024-09-03 | End: 2024-09-03

## 2024-09-03 NOTE — TELEPHONE ENCOUNTER
Patient called requesting a Valium to complete her Breast MRI. Please send script to [facility].

## 2024-09-23 ENCOUNTER — DOCUMENTATION ONLY (OUTPATIENT)
Dept: FAMILY MEDICINE | Facility: CLINIC | Age: 59
End: 2024-09-23
Payer: COMMERCIAL

## 2024-09-23 ENCOUNTER — TELEPHONE (OUTPATIENT)
Dept: FAMILY MEDICINE | Facility: CLINIC | Age: 59
End: 2024-09-23
Payer: COMMERCIAL

## 2024-09-23 LAB
ALKALINE PHOS(POC): 78 (ref 34–104)
ALT: 20 (ref 7–52)
AST: 16 (ref 13–39)
CHOLEST SERPL-MSCNC: 205 MG/DL (ref 0–199)
CHOLEST/HDLC SERPL: 2.8 {RATIO} (ref 0–4.4)
HDLC SERPL-MCNC: 74 MG/DL (ref 30–85)
LDLC SERPL CALC-MCNC: 119 MG/DL (ref 0–130)
NONHDLC SERPL-MCNC: 131 MG/DL (ref 0–129)
TRIGL SERPL-MCNC: 130 MG/DL (ref 10–250)
VLDLC SERPL-MCNC: 26 MG/DL (ref 5–40)

## 2024-09-23 NOTE — TELEPHONE ENCOUNTER
Are there any outstanding tasks in patient's chart?  MRI Breast    2. Do we have outstanding/pending referrals?  n    3. Has the patient been seen in an ER, Urgent Care, or admitted since last visit?    n  4. Has patient seen any other health care providers since last visit?  Dr. Boone, Dr. Quigley    5.  Has patient had any blood work or x-rays done since last visit?   Completed labs with Dr. Boone

## 2024-09-27 LAB
ALKALINE PHOS(POC): 78 (ref 34–104)
ALT: 20 (ref 7–52)
AST: 16 (ref 13–39)
CALCIUM SERPL-MCNC: 9 MG/DL (ref 8.6–10.5)
CHLORIDE: 107 MMOL/L (ref 98–109)
CHOLEST SERPL-MSCNC: 205 MG/DL (ref 0–199)
CHOLEST/HDLC SERPL: 2.8 {RATIO} (ref 0–4.4)
CO2 SERPL-SCNC: 22 MMOL/L (ref 22–34)
CREAT SERPL-MCNC: 0.6 MG/DL (ref 0.6–1.3)
EGFR: 101.6
ERYTHROCYTE [DISTWIDTH] IN BLOOD BY AUTOMATED COUNT: 13.5 % (ref 12.3–17.7)
GLUCOSE: 90 (ref 70–106)
HCT: 43.2 (ref 31.2–41.9)
HDLC SERPL-MCNC: 74 MG/DL (ref 30–85)
HGB: 14.7 (ref 10.9–14.3)
LDLC SERPL CALC-MCNC: 119 MG/DL (ref 0–130)
MCH RBC QN AUTO: 30.9 PG (ref 24.7–32.8)
MCHC RBC AUTO-ENTMCNC: 34 G/DL (ref 32.3–35.6)
MCV RBC AUTO: 90.9 FL (ref 75.5–95.3)
MPC BLD CALC-MCNC: 10.6 FL (ref 7.9–10.8)
NONHDLC SERPL-MCNC: 131 MG/DL (ref 0–129)
PLATELET COUNT: 259 (ref 179–408)
POTASSIUM: 4.6 MMOL/L (ref 3.4–5)
RBC # BLD AUTO: 4.75 10*6/UL (ref 3.63–4.92)
SODIUM BLD-SCNC: 141 MMOL/L (ref 135–145)
TRIGL SERPL-MCNC: 130 MG/DL (ref 10–250)
UREA NITROGEN (BUN): 12 MG/DL (ref 7–25)
VLDLC SERPL-MCNC: 26 MG/DL (ref 5–40)
WBC: 6.5 (ref 3.8–11.8)

## 2024-09-30 ENCOUNTER — OFFICE VISIT (OUTPATIENT)
Dept: FAMILY MEDICINE | Facility: CLINIC | Age: 59
End: 2024-09-30
Payer: COMMERCIAL

## 2024-09-30 ENCOUNTER — PATIENT MESSAGE (OUTPATIENT)
Dept: ADMINISTRATIVE | Facility: OTHER | Age: 59
End: 2024-09-30
Payer: COMMERCIAL

## 2024-09-30 VITALS
HEIGHT: 65 IN | RESPIRATION RATE: 16 BRPM | OXYGEN SATURATION: 95 % | TEMPERATURE: 98 F | BODY MASS INDEX: 33.29 KG/M2 | HEART RATE: 71 BPM | WEIGHT: 199.81 LBS | SYSTOLIC BLOOD PRESSURE: 120 MMHG | DIASTOLIC BLOOD PRESSURE: 80 MMHG

## 2024-09-30 DIAGNOSIS — T46.6X5A STATIN-INDUCED MYOSITIS: ICD-10-CM

## 2024-09-30 DIAGNOSIS — E04.1 THYROID NODULE: ICD-10-CM

## 2024-09-30 DIAGNOSIS — I10 PRIMARY HYPERTENSION: ICD-10-CM

## 2024-09-30 DIAGNOSIS — M60.9 STATIN-INDUCED MYOSITIS: ICD-10-CM

## 2024-09-30 DIAGNOSIS — R93.1 AGATSTON CAC SCORE 100-199: ICD-10-CM

## 2024-09-30 DIAGNOSIS — Z91.89 AT HIGH RISK FOR BREAST CANCER: ICD-10-CM

## 2024-09-30 DIAGNOSIS — Z12.31 BREAST CANCER SCREENING BY MAMMOGRAM: ICD-10-CM

## 2024-09-30 DIAGNOSIS — I25.10 ASCVD (ARTERIOSCLEROTIC CARDIOVASCULAR DISEASE): Primary | ICD-10-CM

## 2024-09-30 DIAGNOSIS — E78.2 MIXED HYPERLIPIDEMIA: ICD-10-CM

## 2024-09-30 DIAGNOSIS — Z00.00 ANNUAL PHYSICAL EXAM: ICD-10-CM

## 2024-09-30 PROCEDURE — 1159F MED LIST DOCD IN RCRD: CPT | Mod: CPTII,,, | Performed by: NURSE PRACTITIONER

## 2024-09-30 PROCEDURE — 1160F RVW MEDS BY RX/DR IN RCRD: CPT | Mod: CPTII,,, | Performed by: NURSE PRACTITIONER

## 2024-09-30 PROCEDURE — 99214 OFFICE O/P EST MOD 30 MIN: CPT | Mod: ,,, | Performed by: NURSE PRACTITIONER

## 2024-09-30 PROCEDURE — 3079F DIAST BP 80-89 MM HG: CPT | Mod: CPTII,,, | Performed by: NURSE PRACTITIONER

## 2024-09-30 PROCEDURE — 3074F SYST BP LT 130 MM HG: CPT | Mod: CPTII,,, | Performed by: NURSE PRACTITIONER

## 2024-09-30 PROCEDURE — 3008F BODY MASS INDEX DOCD: CPT | Mod: CPTII,,, | Performed by: NURSE PRACTITIONER

## 2024-09-30 PROCEDURE — 3044F HG A1C LEVEL LT 7.0%: CPT | Mod: CPTII,,, | Performed by: NURSE PRACTITIONER

## 2024-09-30 RX ORDER — ALIROCUMAB 150 MG/ML
150 INJECTION, SOLUTION SUBCUTANEOUS
COMMUNITY
Start: 2024-09-25

## 2024-09-30 NOTE — ASSESSMENT & PLAN NOTE
Total cholesterol 205, .  Recommend starting Repatha or Praluent per Dr. Boone when covered by insurance.  Continue Zetia.  Follow-up 6 months.

## 2024-09-30 NOTE — ASSESSMENT & PLAN NOTE
Repatha was not covered by insurance, was never able to start the Praluent due to insurance either.  Cardiology records reviewed.  Recommend follow-up with left heart catheterization on Thursday as scheduled.

## 2024-09-30 NOTE — PROGRESS NOTES
Subjective:       Patient ID: Ariela Scott is a 59 y.o. female.    Chief Complaint: Follow-up (6 mth)      HPI   This is a 59-year-old white female who presents to clinic today for six-month follow-up for hyperlipidemia, elevated calcium score, hypertension, high-risk breast cancer, thyroid nodule.  Reports that she did see Cardiology failed her stress test, has left heart catheterization scheduled for this Thursday.  He is trying to get her on injectable medicine for her cholesterol but insurance is not approving it.  Review of Systems  Comprehensive review of systems negative except as stated in HPI    The patient's Health Maintenance was reviewed and the following appears to be due:   There are no preventive care reminders to display for this patient.    Past Medical History:  Past Medical History:   Diagnosis Date    Agatston CAC score 100-199     Depression     GERD (gastroesophageal reflux disease)     Hyperlipidemia     Hypertension     Lateral epicondylitis of left elbow     Myositis     Osteoarthritis of right knee      Past Surgical History:   Procedure Laterality Date    ANTERIOR CRUCIATE LIGAMENT REPAIR Left     CARPAL TUNNEL RELEASE Right     CARPAL TUNNEL RELEASE Left     ELBOW SURGERY      HYSTERECTOMY      KNEE CARTILAGE SURGERY Right     TOTAL HIP ARTHROPLASTY      TOTAL KNEE ARTHROPLASTY Right     Partial     Review of patient's allergies indicates:  No Known Allergies  Current Outpatient Medications on File Prior to Visit   Medication Sig Dispense Refill    alirocumab (PRALUENT PEN) 150 mg/mL PnIj Inject 150 mg into the skin every 14 (fourteen) days.      amLODIPine (NORVASC) 5 MG tablet Take 1 tablet (5 mg total) by mouth once daily. 90 tablet 3    aspirin (ECOTRIN) 81 MG EC tablet Take 81 mg by mouth once daily.      ezetimibe (ZETIA) 10 mg tablet Take 1 tablet (10 mg total) by mouth once daily. 90 tablet 3    [DISCONTINUED] diazePAM (VALIUM) 10 MG Tab Take 1 tablet (10 mg total) by  "mouth once. for 1 dose 1 tablet 0     No current facility-administered medications on file prior to visit.     Social History     Socioeconomic History    Marital status:    Tobacco Use    Smoking status: Every Day     Types: Vaping with nicotine     Passive exposure: Current    Smokeless tobacco: Never   Substance and Sexual Activity    Alcohol use: Not Currently    Drug use: Never    Sexual activity: Yes     Partners: Male     Social Drivers of Health     Financial Resource Strain: Patient Declined (9/23/2024)    Overall Financial Resource Strain (CARDIA)     Difficulty of Paying Living Expenses: Patient declined   Food Insecurity: Patient Declined (9/23/2024)    Hunger Vital Sign     Worried About Running Out of Food in the Last Year: Patient declined     Ran Out of Food in the Last Year: Patient declined   Physical Activity: Inactive (9/23/2024)    Exercise Vital Sign     Days of Exercise per Week: 0 days     Minutes of Exercise per Session: 0 min   Stress: Patient Declined (9/23/2024)    [employer] - Occupational Stress Questionnaire     Feeling of Stress : Patient declined   Housing Stability: Unknown (9/23/2024)    Housing Stability Vital Sign     Unable to Pay for Housing in the Last Year: Patient declined     Family History   Problem Relation Name Age of Onset    Lung cancer Mother      Atrial fibrillation Sister      Breast cancer Sister  62    Atrial fibrillation Sister      Anaya Parkinson White syndrome Brother      Supraventricular tachycardia Son         Objective:       /80 (BP Location: Left arm, Patient Position: Sitting)   Pulse 71   Temp 98.2 °F (36.8 °C) (Oral)   Resp 16   Ht 5' 5" (1.651 m)   Wt 90.6 kg (199 lb 12.8 oz)   SpO2 95%   BMI 33.25 kg/m²      Physical Exam  Vitals and nursing note reviewed.   Constitutional:       Appearance: Normal appearance. She is obese.   HENT:      Head: Normocephalic and atraumatic.      Right Ear: Tympanic " membrane, ear canal and external ear normal.      Left Ear: Tympanic membrane, ear canal and external ear normal.      Nose: Nose normal.      Mouth/Throat:      Mouth: Mucous membranes are moist.      Pharynx: Oropharynx is clear.   Eyes:      Extraocular Movements: Extraocular movements intact.      Conjunctiva/sclera: Conjunctivae normal.      Pupils: Pupils are equal, round, and reactive to light.   Cardiovascular:      Rate and Rhythm: Normal rate and regular rhythm.   Pulmonary:      Effort: Pulmonary effort is normal.      Breath sounds: Normal breath sounds.   Musculoskeletal:         General: Normal range of motion.      Cervical back: Normal range of motion and neck supple.   Skin:     General: Skin is warm and dry.   Neurological:      General: No focal deficit present.      Mental Status: She is alert and oriented to person, place, and time.   Psychiatric:         Mood and Affect: Mood normal.         Behavior: Behavior normal.         Thought Content: Thought content normal.         Judgment: Judgment normal.         Labs  Documentation Only on 09/23/2024   Component Date Value Ref Range Status    ALKALINE PHOS(POC) 09/20/2024 78  34 - 104 Final    AST 09/20/2024 16  13 - 39 Final    ALT 09/20/2024 20  7 - 52 Final    Cholesterol 09/20/2024 205 (A)  0 - 199 mg/dL Final    Triglycerides 09/20/2024 130  10 - 250 mg/dL Final    HDL 09/20/2024 74  30 - 85 mg/dL Final    VLDL 09/20/2024 26  5 - 40 mg/dL Final    Total Cholesterol/HDL Ratio 09/20/2024 2.8  0.0 - 4.4 Final    Non-HDL Cholesterol 09/20/2024 131 (A)  0 - 129 mg/dL Final    LDL Cholesterol 09/20/2024 119  0 - 130 mg/dL Final       Assessment and Plan       ICD-10-CM ICD-9-CM   1. ASCVD (arteriosclerotic cardiovascular disease)  I25.10 429.2     440.9   2. Statin-induced myositis  M60.9 729.1    T46.6X5A E942.2   3. Agatston CAC score 100-199  R93.1 793.2   4. Mixed hyperlipidemia  E78.2 272.2   5. Primary hypertension  I10 401.9   6. At high risk  for breast cancer  Z91.89 V49.89   7. Thyroid nodule  E04.1 241.0   8. Annual physical exam  Z00.00 V70.0   9. Breast cancer screening by mammogram  Z12.31 V76.12        1. ASCVD (arteriosclerotic cardiovascular disease)  Overview:  Dr Boone    07/31/2024 - CACS 1629     09/12/2024 - PET scan demonstrating apical and anterolateral ischemia     Rx Repatha not covered, Rx for Praluent 150 mg every 2 weeks      Assessment & Plan:  Repatha was not covered by insurance, was never able to start the Praluent due to insurance either.  Cardiology records reviewed.  Recommend follow-up with left heart catheterization on Thursday as scheduled.      2. Statin-induced myositis  Overview:  Dr. Boone    Intolerant to statins  Repatha not covered by insurance  Praluent 150 mg twice monthly    Assessment & Plan:  Recommend PCSK9 inhibitor therapy per Cardiology as recommended      3. Agatston CAC score 100-199  Overview:  02/15/80491 - coronary artery calcium score 132.4, referred to Dr Boone     03/12/2018 - normal nuclear stress test    03/15/2018 - TTE with normal LV function. Trace aortic, mitral, and tricuspid regurgitation.    07/31/2024 - CACS 1629 - refer back to Dr Boone    Assessment & Plan:  Follow-up with left heart catheterization this week as scheduled.      4. Mixed hyperlipidemia  Overview:  Simvastatin and pravastatin both caused myalgias    02/15/59213 - coronary artery calcium score 132.4, referred to Dr Boone     03/12/2018 - normal nuclear stress test    03/15/2018 - TTE with normal LV function. Trace aortic, mitral, and tricuspid regurgitation.    03/29/20218 - Zetia 10 mg daily    Assessment & Plan:  Total cholesterol 205, .  Recommend starting Repatha or Praluent per Dr. Boone when covered by insurance.  Continue Zetia.  Follow-up 6 months.      5. Primary hypertension  Overview:  03/06/2024 - amlodipine 5 mg daily    Assessment & Plan:  Stable, continue amlodipine 5 mg daily,  follow-up 6 months.      6. At high risk for breast cancer  Overview:  Dr Moraes    37.3 % lifetime risk per Claudia with personal history of left breast ALH and intraductal papilloma status post excisional biopsy in 2016 at age of 51    02/12/2019 - MRI breast bilateral, normal    03/05/2024 - diagnostic MMG and US - Focal skin thickening up to 4 mm in the 9:00 subareolar left breast in the area of concern. Refer back to Dr Moraes     03/07/2024 - punch biopsy left nipple per Dr Moraes - superficial perivascular dermatitis, no evidence of malignancy    Assessment & Plan:  Does not wish to complete MRI of the breast right now due to upcoming left heart catheterization and multiple tests.  Will get scheduled screening mammogram completed 6 months from now and discuss MRI next year.    Orders:  -     US Breast Bilateral Limited; Future; Expected date: 03/05/2025    7. Thyroid nodule  Overview:  Dr Cortez  Negative FNA 02/11/2019 08/16/2019 - US Thyroid  1.  Stable size of peripherally calcified 1.5 cm left thyroid nodule  with previous nondiagnostic biopsy.  2.  Stable 1 cm right thyroid nodule previously biopsied as benign.  3.  Two newly identified hypoechoic nodules in the left thyroid lobe,  one with poorly defined borders measuring up to 9 mm in size. This can  be followed up with ultrasound.    02/27/2020 - US thyroid - Multinodular goiter not significantly changed. Recommend routine  surveillance.    01/29/2024 - US thyroid - On the right there is some heterogenicity and hypervascularity of the thyroid parenchyma a hypoechoic solid nodule with a calcification is identified measuring 1 cm x 9 mm x 8 mm these corresponds to a TI-RADS type 4 nodule recommendations are biopsy if greater than 1.5 cm follow-up if greater than 1 cm at 1 year, 2 years, 3 years and 5 years.  A subcentimeter nodule is identified on the right measuring 5 mm x 2 mm x 4 mm.  Subcentimeter nodules identified on the left measuring 8  mm x 7 mm x 8 mm and 8 mm x 9 mm x 6 mm these nodules do not meet criteria for biopsy and or surveillance, however, the correspond to a TI-RADS type 4 nodule since both of them have peripheral calcifications and therefore recommendations are for follow-up if greater than 1 cm at 1 year, 2 years, 3 years and 5 years             Assessment & Plan:  Thyroid ultrasound in 6 months.    Orders:  -     US Thyroid; Future; Expected date: 03/03/2025    8. Annual physical exam  -     CBC Auto Differential; Future; Expected date: 03/03/2025  -     Comprehensive Metabolic Panel; Future; Expected date: 03/03/2025  -     Lipid Panel; Future; Expected date: 03/03/2025  -     TSH; Future; Expected date: 03/03/2025  -     Hemoglobin A1C; Future; Expected date: 03/03/2025    9. Breast cancer screening by mammogram  -     Mammo Digital Screening Bilat w/ Mark; Future; Expected date: 03/05/2025           Follow up in 23 weeks (on 3/10/2025) for Annual.

## 2024-09-30 NOTE — ASSESSMENT & PLAN NOTE
Does not wish to complete MRI of the breast right now due to upcoming left heart catheterization and multiple tests.  Will get scheduled screening mammogram completed 6 months from now and discuss MRI next year.

## 2024-10-01 ENCOUNTER — PATIENT MESSAGE (OUTPATIENT)
Dept: ADMINISTRATIVE | Facility: OTHER | Age: 59
End: 2024-10-01
Payer: COMMERCIAL

## 2024-10-02 ENCOUNTER — PATIENT MESSAGE (OUTPATIENT)
Dept: ADMINISTRATIVE | Facility: OTHER | Age: 59
End: 2024-10-02
Payer: COMMERCIAL

## 2024-10-03 ENCOUNTER — HOSPITAL ENCOUNTER (OUTPATIENT)
Facility: HOSPITAL | Age: 59
Discharge: HOME | End: 2024-10-03
Attending: INTERNAL MEDICINE | Admitting: INTERNAL MEDICINE
Payer: COMMERCIAL

## 2024-10-03 DIAGNOSIS — R94.39 ABNORMAL STRESS TEST: ICD-10-CM

## 2024-10-03 DIAGNOSIS — I10 HTN (HYPERTENSION): ICD-10-CM

## 2024-10-03 LAB
OHS QRS DURATION: 86 MS
OHS QTC CALCULATION: 433 MS

## 2024-10-03 PROCEDURE — 27000221 HC OXYGEN, UP TO 24 HOURS

## 2024-10-03 PROCEDURE — C1887 CATHETER, GUIDING: HCPCS | Performed by: INTERNAL MEDICINE

## 2024-10-03 PROCEDURE — 93010 ELECTROCARDIOGRAM REPORT: CPT | Mod: ,,, | Performed by: INTERNAL MEDICINE

## 2024-10-03 PROCEDURE — 25000003 PHARM REV CODE 250: Performed by: INTERNAL MEDICINE

## 2024-10-03 PROCEDURE — C1769 GUIDE WIRE: HCPCS | Performed by: INTERNAL MEDICINE

## 2024-10-03 PROCEDURE — 63600175 PHARM REV CODE 636 W HCPCS: Performed by: INTERNAL MEDICINE

## 2024-10-03 PROCEDURE — 93458 L HRT ARTERY/VENTRICLE ANGIO: CPT | Performed by: INTERNAL MEDICINE

## 2024-10-03 PROCEDURE — 93005 ELECTROCARDIOGRAM TRACING: CPT

## 2024-10-03 RX ORDER — VERAPAMIL HYDROCHLORIDE 2.5 MG/ML
INJECTION, SOLUTION INTRAVENOUS
Status: DISCONTINUED | OUTPATIENT
Start: 2024-10-03 | End: 2024-10-03 | Stop reason: HOSPADM

## 2024-10-03 RX ORDER — HEPARIN SODIUM 1000 [USP'U]/ML
INJECTION, SOLUTION INTRAVENOUS; SUBCUTANEOUS
Status: DISCONTINUED | OUTPATIENT
Start: 2024-10-03 | End: 2024-10-03 | Stop reason: HOSPADM

## 2024-10-03 RX ORDER — MIDAZOLAM HYDROCHLORIDE 1 MG/ML
INJECTION INTRAMUSCULAR; INTRAVENOUS
Status: DISCONTINUED | OUTPATIENT
Start: 2024-10-03 | End: 2024-10-03 | Stop reason: HOSPADM

## 2024-10-03 RX ORDER — SODIUM CHLORIDE 9 MG/ML
INJECTION, SOLUTION INTRAVENOUS ONCE
Status: COMPLETED | OUTPATIENT
Start: 2024-10-03 | End: 2024-10-03

## 2024-10-03 RX ORDER — ACETAMINOPHEN 325 MG/1
650 TABLET ORAL EVERY 4 HOURS PRN
Status: DISCONTINUED | OUTPATIENT
Start: 2024-10-03 | End: 2024-10-03 | Stop reason: HOSPADM

## 2024-10-03 RX ORDER — ONDANSETRON 4 MG/1
8 TABLET, ORALLY DISINTEGRATING ORAL EVERY 8 HOURS PRN
Status: DISCONTINUED | OUTPATIENT
Start: 2024-10-03 | End: 2024-10-03 | Stop reason: HOSPADM

## 2024-10-03 RX ORDER — NITROGLYCERIN 20 MG/100ML
INJECTION INTRAVENOUS
Status: DISCONTINUED | OUTPATIENT
Start: 2024-10-03 | End: 2024-10-03 | Stop reason: HOSPADM

## 2024-10-03 RX ORDER — LIDOCAINE HYDROCHLORIDE 10 MG/ML
INJECTION, SOLUTION INFILTRATION; PERINEURAL
Status: DISCONTINUED | OUTPATIENT
Start: 2024-10-03 | End: 2024-10-03 | Stop reason: HOSPADM

## 2024-10-03 RX ORDER — FUROSEMIDE 40 MG/1
40 TABLET ORAL DAILY
Qty: 30 TABLET | Refills: 11 | Status: SHIPPED | OUTPATIENT
Start: 2024-10-03 | End: 2025-10-03

## 2024-10-03 RX ORDER — DIAZEPAM 5 MG/1
10 TABLET ORAL
Status: DISCONTINUED | OUTPATIENT
Start: 2024-10-03 | End: 2024-10-03 | Stop reason: HOSPADM

## 2024-10-03 RX ORDER — IBUPROFEN 100 MG
750 TABLET ORAL DAILY
COMMUNITY

## 2024-10-03 RX ORDER — DIPHENHYDRAMINE HCL 25 MG
50 CAPSULE ORAL
Status: DISCONTINUED | OUTPATIENT
Start: 2024-10-03 | End: 2024-10-03 | Stop reason: HOSPADM

## 2024-10-03 RX ORDER — FENTANYL CITRATE 50 UG/ML
INJECTION, SOLUTION INTRAMUSCULAR; INTRAVENOUS
Status: DISCONTINUED | OUTPATIENT
Start: 2024-10-03 | End: 2024-10-03 | Stop reason: HOSPADM

## 2024-10-03 RX ADMIN — SODIUM CHLORIDE: 9 INJECTION, SOLUTION INTRAVENOUS at 08:10

## 2024-10-03 RX ADMIN — DIAZEPAM 10 MG: 5 TABLET ORAL at 08:10

## 2024-10-03 RX ADMIN — DIPHENHYDRAMINE HYDROCHLORIDE 50 MG: 25 CAPSULE ORAL at 08:10

## 2024-10-03 NOTE — DISCHARGE INSTRUCTIONS
..Post-Procedure Care Instructions  Remove the Dressing: Take off the dressing and arm board after 24 hours.  Driving: Avoid driving for the next two days.  Lifting: Do not lift anything heavier than a gallon of milk for five days.  Bathing: If you have a groin site, avoid taking tub baths for five days. If radial site, Do not submerge arm in water for five days  Skin Care: Refrain from using lotions, powders, or creams around the site for five days.  Emergency Signs: Go to the nearest emergency room if you develop a fever, notice any discharge from the site, or if the site appears red or swollen.  Bleeding: If the site starts to bleed, lie flat on the ground, apply pressure to the area, and call 911 immediately.

## 2024-10-03 NOTE — INTERVAL H&P NOTE
Patient name: Ariela Scott  MRN: [id number]  : 1965  Cath Lab Procedure H&P Update    Pre-Procedure Assessment:    I saw and examined the patient face to face. The patient has been re-evaluated and her condition is unchanged. The reason for admission, procedure and care is still present.  Based on the patients H&P, pre-procedure physical exam, relevant diagnostic studies, NPO status and information obtained from the patient, I determine the patient is an appropriate candidate for the proposed procedure and anesthesia planned. I further certify the anesthesia risks, benefits and options have been explained to the patient to which she agrees as documented on the procedural consent.

## 2024-10-03 NOTE — Clinical Note
The catheter was inserted into the and was inserted over the wire into the left ventricle. Hemodynamics were performed.  and Pullback was recorded.  An angiography was performed of the LV. The angiography was performed via power injection.   65%. EDP 19

## 2024-10-04 ENCOUNTER — PATIENT MESSAGE (OUTPATIENT)
Dept: ADMINISTRATIVE | Facility: OTHER | Age: 59
End: 2024-10-04
Payer: COMMERCIAL

## 2024-10-05 ENCOUNTER — PATIENT MESSAGE (OUTPATIENT)
Dept: ADMINISTRATIVE | Facility: OTHER | Age: 59
End: 2024-10-05
Payer: COMMERCIAL

## 2024-10-08 VITALS
SYSTOLIC BLOOD PRESSURE: 135 MMHG | HEIGHT: 66 IN | OXYGEN SATURATION: 95 % | TEMPERATURE: 98 F | WEIGHT: 201.25 LBS | DIASTOLIC BLOOD PRESSURE: 80 MMHG | HEART RATE: 63 BPM | BODY MASS INDEX: 32.34 KG/M2 | RESPIRATION RATE: 13 BRPM

## 2024-10-14 ENCOUNTER — DOCUMENTATION ONLY (OUTPATIENT)
Dept: FAMILY MEDICINE | Facility: CLINIC | Age: 59
End: 2024-10-14
Payer: COMMERCIAL

## 2025-03-03 ENCOUNTER — HOSPITAL ENCOUNTER (OUTPATIENT)
Dept: RADIOLOGY | Facility: HOSPITAL | Age: 60
Discharge: HOME | End: 2025-03-03
Attending: NURSE PRACTITIONER
Payer: COMMERCIAL

## 2025-03-03 ENCOUNTER — RESULTS FOLLOW-UP (OUTPATIENT)
Dept: FAMILY MEDICINE | Facility: CLINIC | Age: 60
End: 2025-03-03

## 2025-03-03 ENCOUNTER — TELEPHONE (OUTPATIENT)
Dept: FAMILY MEDICINE | Facility: CLINIC | Age: 60
End: 2025-03-03
Payer: COMMERCIAL

## 2025-03-03 DIAGNOSIS — E04.1 THYROID NODULE: ICD-10-CM

## 2025-03-03 PROCEDURE — 76536 US EXAM OF HEAD AND NECK: CPT | Mod: TC

## 2025-03-03 NOTE — TELEPHONE ENCOUNTER
No answer/left message on 3/3/25 at 8:06 am reminding patient about upcoming visit with labs needed prior to appointment. Lab order in chart

## 2025-03-07 ENCOUNTER — LAB VISIT (OUTPATIENT)
Dept: LAB | Facility: HOSPITAL | Age: 60
End: 2025-03-07
Attending: NURSE PRACTITIONER
Payer: COMMERCIAL

## 2025-03-07 DIAGNOSIS — Z00.00 ANNUAL PHYSICAL EXAM: ICD-10-CM

## 2025-03-07 LAB
ALBUMIN SERPL-MCNC: 3.8 G/DL (ref 3.4–4.8)
ALBUMIN/GLOB SERPL: 1.1 RATIO (ref 1.1–2)
ALP SERPL-CCNC: 94 UNIT/L (ref 40–150)
ALT SERPL-CCNC: 15 UNIT/L (ref 0–55)
ANION GAP SERPL CALC-SCNC: 8 MEQ/L
AST SERPL-CCNC: 14 UNIT/L (ref 5–34)
BASOPHILS # BLD AUTO: 0.08 X10(3)/MCL
BASOPHILS NFR BLD AUTO: 1.2 %
BILIRUB SERPL-MCNC: 1.1 MG/DL
BUN SERPL-MCNC: 10.3 MG/DL (ref 9.8–20.1)
CALCIUM SERPL-MCNC: 9.1 MG/DL (ref 8.4–10.2)
CHLORIDE SERPL-SCNC: 108 MMOL/L (ref 98–107)
CHOLEST SERPL-MCNC: 188 MG/DL
CHOLEST/HDLC SERPL: 2 {RATIO} (ref 0–5)
CO2 SERPL-SCNC: 24 MMOL/L (ref 23–31)
CREAT SERPL-MCNC: 0.7 MG/DL (ref 0.55–1.02)
CREAT/UREA NIT SERPL: 15
EOSINOPHIL # BLD AUTO: 0.17 X10(3)/MCL (ref 0–0.9)
EOSINOPHIL NFR BLD AUTO: 2.5 %
ERYTHROCYTE [DISTWIDTH] IN BLOOD BY AUTOMATED COUNT: 13.4 % (ref 11.5–17)
EST. AVERAGE GLUCOSE BLD GHB EST-MCNC: 116.9 MG/DL
GFR SERPLBLD CREATININE-BSD FMLA CKD-EPI: >60 ML/MIN/1.73/M2
GLOBULIN SER-MCNC: 3.5 GM/DL (ref 2.4–3.5)
GLUCOSE SERPL-MCNC: 93 MG/DL (ref 82–115)
HBA1C MFR BLD: 5.7 %
HCT VFR BLD AUTO: 41.4 % (ref 37–47)
HDLC SERPL-MCNC: 91 MG/DL (ref 35–60)
HGB BLD-MCNC: 13.6 G/DL (ref 12–16)
IMM GRANULOCYTES # BLD AUTO: 0.01 X10(3)/MCL (ref 0–0.04)
IMM GRANULOCYTES NFR BLD AUTO: 0.1 %
LDLC SERPL CALC-MCNC: 76 MG/DL (ref 50–140)
LYMPHOCYTES # BLD AUTO: 2.24 X10(3)/MCL (ref 0.6–4.6)
LYMPHOCYTES NFR BLD AUTO: 33.1 %
MCH RBC QN AUTO: 29.6 PG (ref 27–31)
MCHC RBC AUTO-ENTMCNC: 32.9 G/DL (ref 33–36)
MCV RBC AUTO: 90.2 FL (ref 80–94)
MONOCYTES # BLD AUTO: 0.46 X10(3)/MCL (ref 0.1–1.3)
MONOCYTES NFR BLD AUTO: 6.8 %
NEUTROPHILS # BLD AUTO: 3.8 X10(3)/MCL (ref 2.1–9.2)
NEUTROPHILS NFR BLD AUTO: 56.3 %
PLATELET # BLD AUTO: 281 X10(3)/MCL (ref 130–400)
PMV BLD AUTO: 11 FL (ref 7.4–10.4)
POTASSIUM SERPL-SCNC: 4.1 MMOL/L (ref 3.5–5.1)
PROT SERPL-MCNC: 7.3 GM/DL (ref 5.8–7.6)
RBC # BLD AUTO: 4.59 X10(6)/MCL (ref 4.2–5.4)
SODIUM SERPL-SCNC: 140 MMOL/L (ref 136–145)
TRIGL SERPL-MCNC: 106 MG/DL (ref 37–140)
TSH SERPL-ACNC: 1.14 UIU/ML (ref 0.35–4.94)
VLDLC SERPL CALC-MCNC: 21 MG/DL
WBC # BLD AUTO: 6.76 X10(3)/MCL (ref 4.5–11.5)

## 2025-03-07 PROCEDURE — 80053 COMPREHEN METABOLIC PANEL: CPT

## 2025-03-07 PROCEDURE — 84443 ASSAY THYROID STIM HORMONE: CPT

## 2025-03-07 PROCEDURE — 36415 COLL VENOUS BLD VENIPUNCTURE: CPT

## 2025-03-07 PROCEDURE — 83036 HEMOGLOBIN GLYCOSYLATED A1C: CPT

## 2025-03-07 PROCEDURE — 80061 LIPID PANEL: CPT

## 2025-03-07 PROCEDURE — 85025 COMPLETE CBC W/AUTO DIFF WBC: CPT

## 2025-03-07 NOTE — TELEPHONE ENCOUNTER
PATIENT WILL TRY TO HAVE LABS DONE TOMORROW.  WENT FOR US 3/3/25 AND THEY DID NOT DRAW THEM WHEN ORDERS WHERE IN THE COMPUTER ALREADY

## 2025-03-10 ENCOUNTER — OFFICE VISIT (OUTPATIENT)
Dept: FAMILY MEDICINE | Facility: CLINIC | Age: 60
End: 2025-03-10
Payer: COMMERCIAL

## 2025-03-10 VITALS
HEIGHT: 66 IN | HEART RATE: 73 BPM | BODY MASS INDEX: 32.78 KG/M2 | RESPIRATION RATE: 16 BRPM | WEIGHT: 204 LBS | DIASTOLIC BLOOD PRESSURE: 85 MMHG | OXYGEN SATURATION: 97 % | SYSTOLIC BLOOD PRESSURE: 128 MMHG

## 2025-03-10 DIAGNOSIS — Z00.00 ANNUAL PHYSICAL EXAM: Primary | ICD-10-CM

## 2025-03-10 DIAGNOSIS — I25.10 ASCVD (ARTERIOSCLEROTIC CARDIOVASCULAR DISEASE): ICD-10-CM

## 2025-03-10 DIAGNOSIS — Z12.2 SCREENING FOR MALIGNANT NEOPLASM OF RESPIRATORY ORGAN: ICD-10-CM

## 2025-03-10 DIAGNOSIS — T46.6X5A STATIN-INDUCED MYOSITIS: ICD-10-CM

## 2025-03-10 DIAGNOSIS — R06.83 SNORES: ICD-10-CM

## 2025-03-10 DIAGNOSIS — I10 PRIMARY HYPERTENSION: ICD-10-CM

## 2025-03-10 DIAGNOSIS — R73.03 PREDIABETES: ICD-10-CM

## 2025-03-10 DIAGNOSIS — Z91.89 AT HIGH RISK FOR BREAST CANCER: ICD-10-CM

## 2025-03-10 DIAGNOSIS — M60.9 STATIN-INDUCED MYOSITIS: ICD-10-CM

## 2025-03-10 DIAGNOSIS — E78.2 MIXED HYPERLIPIDEMIA: ICD-10-CM

## 2025-03-10 DIAGNOSIS — E04.1 THYROID NODULE: ICD-10-CM

## 2025-03-10 PROCEDURE — 3044F HG A1C LEVEL LT 7.0%: CPT | Mod: CPTII,,, | Performed by: NURSE PRACTITIONER

## 2025-03-10 PROCEDURE — 3008F BODY MASS INDEX DOCD: CPT | Mod: CPTII,,, | Performed by: NURSE PRACTITIONER

## 2025-03-10 PROCEDURE — 3074F SYST BP LT 130 MM HG: CPT | Mod: CPTII,,, | Performed by: NURSE PRACTITIONER

## 2025-03-10 PROCEDURE — 1159F MED LIST DOCD IN RCRD: CPT | Mod: CPTII,,, | Performed by: NURSE PRACTITIONER

## 2025-03-10 PROCEDURE — 1160F RVW MEDS BY RX/DR IN RCRD: CPT | Mod: CPTII,,, | Performed by: NURSE PRACTITIONER

## 2025-03-10 PROCEDURE — 99396 PREV VISIT EST AGE 40-64: CPT | Mod: ,,, | Performed by: NURSE PRACTITIONER

## 2025-03-10 PROCEDURE — 3079F DIAST BP 80-89 MM HG: CPT | Mod: CPTII,,, | Performed by: NURSE PRACTITIONER

## 2025-03-10 NOTE — PROGRESS NOTES
Subjective:       Patient ID: Ariela Scott is a 60 y.o. female.    Chief Complaint: Annual Exam      History of Present Illness    CHIEF COMPLAINT:  Patient presents today for wellness visit and follow up of chronic medical conditions.    LABS AND IMAGING:  Total cholesterol was 188, HDL 91, and LDL 76. Hemoglobin A1c was 5.7, in the pre-diabetic range. Thyroid labs were normal. Thyroid ultrasound showed stable nodules. Heart catheterization was normal without significant blockages.    SLEEP:  She reports feeling tired all the time. Others have noted snoring, though she is unaware of this. She denies daytime naps or falling asleep in her recliner.    MEDICATIONS:  She is taking Praluent weekly injections, Zetia, amlodipine, and aspirin with reported adherence to the regimen.    DIET AND EXERCISE:  She reports consuming sweets such as cake or ice cream at night and drinks Sunkist Zero. She denies any exercise and remains sedentary throughout the day.    SOCIAL HISTORY:  She started smoking at age 11 and smoked one pack per day until 2016, when she switched to vaping which continues currently.    ENT:  She reports dry ears, which she manages with Vaseline application.        Review of Systems  Comprehensive review of systems negative except as stated in HPI    The patient's Health Maintenance was reviewed and the following appears to be due:   Health Maintenance Due   Topic Date Due    LDCT Lung Screen  Never done    Mammogram  03/05/2025       Past Medical History:  Past Medical History:   Diagnosis Date    Agatston CAC score 100-199     Depression     GERD (gastroesophageal reflux disease)     Hyperlipidemia     Hypertension     Lateral epicondylitis of left elbow     Myositis     Osteoarthritis of right knee      Past Surgical History:   Procedure Laterality Date    ANTERIOR CRUCIATE LIGAMENT REPAIR Left     CARPAL TUNNEL RELEASE Right     CARPAL TUNNEL RELEASE Left     ELBOW SURGERY      HYSTERECTOMY    "   KNEE CARTILAGE SURGERY Right     LEFT HEART CATHETERIZATION Left 10/3/2024    Procedure: Left heart cath;  Surgeon: Naif Boone MD;  Location: [facility] CATH LAB;  Service: Cardiology;  Laterality: Left;  LHC    TOTAL HIP ARTHROPLASTY      TOTAL KNEE ARTHROPLASTY Right     Partial     Review of patient's allergies indicates:   Allergen Reactions    Statins-hmg-coa reductase inhibitors      Medications Ordered Prior to Encounter[1]  Social History[2]  Family History   Problem Relation Name Age of Onset    Lung cancer Mother      Atrial fibrillation Sister      Breast cancer Sister  62    Atrial fibrillation Sister      Anaya Parkinson White syndrome Brother      Supraventricular tachycardia Son         Objective:       /85 (BP Location: Left arm)   Pulse 73   Resp 16   Ht 5' 6" (1.676 m)   Wt 92.5 kg (204 lb)   SpO2 97%   BMI 32.93 kg/m²      Physical Exam  Vitals and nursing note reviewed.   Constitutional:       Appearance: Normal appearance. She is obese.   HENT:      Head: Normocephalic and atraumatic.      Right Ear: Tympanic membrane, ear canal and external ear normal.      Left Ear: Tympanic membrane, ear canal and external ear normal.      Nose: Nose normal.      Mouth/Throat:      Mouth: Mucous membranes are moist.      Pharynx: Oropharynx is clear.   Eyes:      Extraocular Movements: Extraocular movements intact.      Conjunctiva/sclera: Conjunctivae normal.      Pupils: Pupils are equal, round, and reactive to light.   Cardiovascular:      Rate and Rhythm: Normal rate and regular rhythm.      Heart sounds: Normal heart sounds.   Pulmonary:      Effort: Pulmonary effort is normal.      Breath sounds: Normal breath sounds.   Abdominal:      General: Abdomen is flat. Bowel sounds are normal.      Palpations: Abdomen is soft.   Musculoskeletal:         General: Normal range of motion.      Cervical back: Normal range of motion and neck supple.   Skin:     General: Skin is warm and dry. "   Neurological:      General: No focal deficit present.      Mental Status: She is alert and oriented to person, place, and time.   Psychiatric:         Mood and Affect: Mood normal.         Behavior: Behavior normal.         Thought Content: Thought content normal.         Judgment: Judgment normal.         Labs  Lab Visit on 03/07/2025   Component Date Value Ref Range Status    Sodium 03/07/2025 140  136 - 145 mmol/L Final    Potassium 03/07/2025 4.1  3.5 - 5.1 mmol/L Final    Chloride 03/07/2025 108 (H)  98 - 107 mmol/L Final    CO2 03/07/2025 24  23 - 31 mmol/L Final    Glucose 03/07/2025 93  82 - 115 mg/dL Final    Blood Urea Nitrogen 03/07/2025 10.3  9.8 - 20.1 mg/dL Final    Creatinine 03/07/2025 0.70  0.55 - 1.02 mg/dL Final    Calcium 03/07/2025 9.1  8.4 - 10.2 mg/dL Final    Protein Total 03/07/2025 7.3  5.8 - 7.6 gm/dL Final    Albumin 03/07/2025 3.8  3.4 - 4.8 g/dL Final    Globulin 03/07/2025 3.5  2.4 - 3.5 gm/dL Final    Albumin/Globulin Ratio 03/07/2025 1.1  1.1 - 2.0 ratio Final    Bilirubin Total 03/07/2025 1.1  <=1.5 mg/dL Final    ALP 03/07/2025 94  40 - 150 unit/L Final    ALT 03/07/2025 15  0 - 55 unit/L Final    AST 03/07/2025 14  5 - 34 unit/L Final    eGFR 03/07/2025 >60  mL/min/1.73/m2 Final    Estimated GFR calculated using the CKD-EPI creatinine (2021) equation.    Anion Gap 03/07/2025 8.0  mEq/L Final    BUN/Creatinine Ratio 03/07/2025 15   Final    Cholesterol Total 03/07/2025 188  <=200 mg/dL Final    HDL Cholesterol 03/07/2025 91 (H)  35 - 60 mg/dL Final    Triglyceride 03/07/2025 106  37 - 140 mg/dL Final    Cholesterol/HDL Ratio 03/07/2025 2  0 - 5 Final    Very Low Density Lipoprotein 03/07/2025 21   Final    LDL Cholesterol 03/07/2025 76.00  50.00 - 140.00 mg/dL Final    LDL calculated using the Friedewald equation.    TSH 03/07/2025 1.138  0.350 - 4.940 uIU/mL Final    Hemoglobin A1c 03/07/2025 5.7  <=7.0 % Final    Estimated Average Glucose 03/07/2025 116.9  mg/dL Final    WBC  03/07/2025 6.76  4.50 - 11.50 x10(3)/mcL Final    RBC 03/07/2025 4.59  4.20 - 5.40 x10(6)/mcL Final    Hgb 03/07/2025 13.6  12.0 - 16.0 g/dL Final    Hct 03/07/2025 41.4  37.0 - 47.0 % Final    MCV 03/07/2025 90.2  80.0 - 94.0 fL Final    MCH 03/07/2025 29.6  27.0 - 31.0 pg Final    MCHC 03/07/2025 32.9 (L)  33.0 - 36.0 g/dL Final    RDW 03/07/2025 13.4  11.5 - 17.0 % Final    Platelet 03/07/2025 281  130 - 400 x10(3)/mcL Final    MPV 03/07/2025 11.0 (H)  7.4 - 10.4 fL Final    Neut % 03/07/2025 56.3  % Final    Lymph % 03/07/2025 33.1  % Final    Mono % 03/07/2025 6.8  % Final    Eos % 03/07/2025 2.5  % Final    Basophil % 03/07/2025 1.2  % Final    Imm Grans % 03/07/2025 0.1  % Final    Neut # 03/07/2025 3.80  2.1 - 9.2 x10(3)/mcL Final    Lymph # 03/07/2025 2.24  0.6 - 4.6 x10(3)/mcL Final    Mono # 03/07/2025 0.46  0.1 - 1.3 x10(3)/mcL Final    Eos # 03/07/2025 0.17  0 - 0.9 x10(3)/mcL Final    Baso # 03/07/2025 0.08  <=0.2 x10(3)/mcL Final    Imm Gran # 03/07/2025 0.01  0.00 - 0.04 x10(3)/mcL Final   Documentation Only on 10/14/2024   Component Date Value Ref Range Status    Cholesterol 09/27/2024 205 (A)  0 - 199 mg/dL Final    Triglycerides 09/27/2024 130  10 - 250 mg/dL Final    HDL 09/27/2024 74  30 - 85 mg/dL Final    VLDL 09/27/2024 26  5 - 40 mg/dL Final    Total Cholesterol/HDL Ratio 09/27/2024 2.8  0.0 - 4.4 Final    Non-HDL Cholesterol 09/27/2024 131 (A)  0 - 129 mg/dL Corrected    LDL Cholesterol 09/27/2024 119  0 - 130 mg/dL Final    WBC 09/27/2024 6.5  3.8 - 11.8 Final    RBC 09/27/2024 4.75  3.63 - 4.92 Final    Hgb 09/27/2024 14.7 (A)  10.9 - 14.3 Final    HCT 09/27/2024 43.2 (A)  31.2 - 41.9 Final    MCV 09/27/2024 90.9  75.5 - 95.3 Final    MCH 09/27/2024 30.9  24.7 - 32.8 Final    MCHC 09/27/2024 34.0  32.3 - 35.6 g/dL Final    RDW 09/27/2024 13.5  12.3 - 17.7 % Final    PLATELET COUNT 09/27/2024 259.0  179.0 - 408.0 Final    MPV 09/27/2024 10.6  7.9 - 10.8 fL Final    Glucose 09/27/2024 90   70 - 106 Final    BUN 09/27/2024 12  7 - 25 mg/dL Final    Creatinine 09/27/2024 0.6  0.6 - 1.3 mg/dL Final    Sodium 09/27/2024 141  135 - 145 mmol/L Final    Potassium 09/27/2024 4.6  3.4 - 5.0 mmol/L Final    Chloride 09/27/2024 107  98 - 109 mmol/L Final    CO2 09/27/2024 22  22 - 34 mmol/L Final    Calcium 09/27/2024 9.0  8.6 - 10.5 mg/dL Final    eGFR 09/27/2024 101.6   Final    eGFR  09/27/2024 123  mL/min/1.73m² Final    ALKALINE PHOS(POC) 09/27/2024 78  34 - 104 Final    AST 09/27/2024 16  13 - 39 Final    ALT 09/27/2024 20  7 - 52 Final   Admission on 10/03/2024, Discharged on 10/03/2024   Component Date Value Ref Range Status    QRS Duration 10/03/2024 86  ms Final    [facility] QTC Calculation 10/03/2024 433  ms Final   Documentation Only on 09/23/2024   Component Date Value Ref Range Status    ALKALINE PHOS(POC) 09/20/2024 78  34 - 104 Final    AST 09/20/2024 16  13 - 39 Final    ALT 09/20/2024 20  7 - 52 Final    Cholesterol 09/20/2024 205 (A)  0 - 199 mg/dL Final    Triglycerides 09/20/2024 130  10 - 250 mg/dL Final    HDL 09/20/2024 74  30 - 85 mg/dL Final    VLDL 09/20/2024 26  5 - 40 mg/dL Final    Total Cholesterol/HDL Ratio 09/20/2024 2.8  0.0 - 4.4 Final    Non-HDL Cholesterol 09/20/2024 131 (A)  0 - 129 mg/dL Final    LDL Cholesterol 09/20/2024 119  0 - 130 mg/dL Final       Assessment and Plan     Assessment & Plan    Z00.00 Annual physical exam  I25.10 ASCVD (arteriosclerotic cardiovascular disease)  E78.2 Mixed hyperlipidemia  I10 Primary hypertension  Z91.89 At high risk for breast cancer  E04.1 Thyroid nodule  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, 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    I25.10 ASCVD (ARTERIOSCLEROTIC CARDIOVASCULAR DISEASE):  - Educated the patient about HDL as "good" cholesterol that protects the heart.  - Continued aspirin therapy.    E78.2 MIXED HYPERLIPIDEMIA:  - Continued Zetia medication and weekly Praluent injections.    I10 PRIMARY HYPERTENSION:  - Continued amlodipine medication.    R73.03 PREDIABETES:  - Educated the patient about pre-diabetes range (HbA1c 5.6-6.3%) and diabetes diagnosis threshold (HbA1c =6.4%).    Z12.2 SCREENING FOR MALIGNANT NEOPLASM OF RESPIRATORY ORGAN:  - Explained the purpose of lung cancer screening CT for early detection of lung nodules or changes.  - Ordered low-dose lung cancer screening CT.           1. Annual physical exam  Overview:    Annual wellness exam yearly in March      2. ASCVD (arteriosclerotic cardiovascular disease)  Overview:  Dr Boone    07/31/2024 - CACS 1629     09/12/2024 - PET scan demonstrating apical and anterolateral ischemia     Rx Repatha not covered, Rx for Praluent 150 mg every 2 weeks    10/03/2024 - [facility] - calcified [place] coronary anatomy.  LVEF 65%        3. Mixed hyperlipidemia  Overview:  Simvastatin and pravastatin both caused myalgias    02/15/47699 - coronary artery calcium score 132.4, referred to Dr Boone     03/12/2018 - normal nuclear stress test    03/15/2018 - TTE with normal LV function. Trace aortic, mitral, and tricuspid regurgitation.    03/29/20218 - Zetia 10 mg daily    Orders:  -     Comprehensive Metabolic Panel; Future; Expected date: 09/10/2025  -     Lipid Panel; Future; Expected date: 09/10/2025    4. Primary hypertension  Overview:  03/06/2024 - amlodipine 5 mg daily    Orders:  -     Comprehensive Metabolic Panel; Future; Expected date: " 09/10/2025    5. At high risk for breast cancer  Overview:  Dr Moraes    37.3 % lifetime risk per Claudia with personal history of left breast ALH and intraductal papilloma status post excisional biopsy in 2016 at age of 51    02/12/2019 - MRI breast bilateral, normal    03/05/2024 - diagnostic MMG and US - Focal skin thickening up to 4 mm in the 9:00 subareolar left breast in the area of concern. Refer back to Dr Moraes     03/07/2024 - punch biopsy left nipple per Dr Moraes - superficial perivascular dermatitis, no evidence of malignancy    Assessment & Plan:  Mammogram scheduled for March 24th      6. Thyroid nodule  Overview:  Dr Cortez  Negative FNA 02/11/2019 08/16/2019 - US Thyroid  1.  Stable size of peripherally calcified 1.5 cm left thyroid nodule  with previous nondiagnostic biopsy.  2.  Stable 1 cm right thyroid nodule previously biopsied as benign.  3.  Two newly identified hypoechoic nodules in the left thyroid lobe,  one with poorly defined borders measuring up to 9 mm in size. This can  be followed up with ultrasound.    02/27/2020 - US thyroid - Multinodular goiter not significantly changed. Recommend routine  surveillance.    01/29/2024 - US thyroid - On the right there is some heterogenicity and hypervascularity of the thyroid parenchyma a hypoechoic solid nodule with a calcification is identified measuring 1 cm x 9 mm x 8 mm these corresponds to a TI-RADS type 4 nodule recommendations are biopsy if greater than 1.5 cm follow-up if greater than 1 cm at 1 year, 2 years, 3 years and 5 years.  A subcentimeter nodule is identified on the right measuring 5 mm x 2 mm x 4 mm.  Subcentimeter nodules identified on the left measuring 8 mm x 7 mm x 8 mm and 8 mm x 9 mm x 6 mm these nodules do not meet criteria for biopsy and or surveillance, however, the correspond to a TI-RADS type 4 nodule since both of them have peripheral calcifications and therefore recommendations are for follow-up if greater  than 1 cm at 1 year, 2 years, 3 years and 5 years    03/03/2025 - US thyroid - There is evidence of a solid hypoechoic nodule that measures 1 cm x 9 mm x 8 mm these nodule corresponds to a TI-RADS type 4 nodule recommendations are follow-up if greater than 1 cm at 1 year, 2 years, 3 years and 5 years. There is a subcentimeter nodule that measures 4 mm x 2 mm x 4 mm these nodule does not meet criteria for biopsy and or surveillance. There is some heterogenicity of the thyroid parenchyma on the left there is a 9 mm x 8 mm x 1 cm hypoechoic nodule with a peripheral calcification corresponding to a TI-RADS type 4 nodule recommendations are follow-up if greater than 1 cm at 1 year, 2 years, 3 years and 5 years.   There is a subcentimeter nodule that measures 7 mm x 7 mm x 9 mm these nodule does not meet criteria for biopsy and or surveillance    Assessment & Plan:  Repeat thyroid ultrasound in 1 year    Orders:  -     TSH; Future; Expected date: 09/10/2025  -     US Thyroid; Future; Expected date: 03/10/2026    7. Statin-induced myositis  Overview:  Dr. Boone    Intolerant to statins  Repatha not covered by insurance  Praluent 150 mg twice monthly      8. Prediabetes  Overview:  Diet controlled     Orders:  -     Comprehensive Metabolic Panel; Future; Expected date: 09/10/2025  -     Hemoglobin A1C; Future; Expected date: 09/10/2025    9. Screening for malignant neoplasm of respiratory organ  -     CT Chest Lung Screening Low Dose; Future; Expected date: 03/10/2025    10. Snores  Assessment & Plan:  Declines home sleep study             Follow up in about 6 months (around 9/10/2025) for follow up.     This note was generated with the assistance of ambient listening technology. Verbal consent was obtained by the patient and accompanying visitor(s) for the recording of patient appointment to facilitate this note. I attest to having reviewed and edited the generated note for accuracy, though some syntax or spelling  errors may persist. Please contact the author of this note for any clarification.            [1]   Current Outpatient Medications on File Prior to Visit   Medication Sig Dispense Refill    alirocumab (PRALUENT PEN) 150 mg/mL PnIj Inject 150 mg into the skin every 14 (fourteen) days.      amLODIPine (NORVASC) 5 MG tablet Take 1 tablet (5 mg total) by mouth once daily. 90 tablet 3    aspirin (ECOTRIN) 81 MG EC tablet Take 81 mg by mouth once daily.      ezetimibe (ZETIA) 10 mg tablet Take 1 tablet (10 mg total) by mouth once daily. 90 tablet 3    [DISCONTINUED] calcium carbonate (CALCIUM ANTACID) 300 mg (750 mg) Chew Take 750 mg by mouth once daily. (Patient not taking: Reported on 3/10/2025)      [DISCONTINUED] furosemide (LASIX) 40 MG tablet Take 1 tablet (40 mg total) by mouth once daily. (Patient not taking: Reported on 3/10/2025) 30 tablet 11     No current facility-administered medications on file prior to visit.   [2]   Social History  Socioeconomic History    Marital status:    Tobacco Use    Smoking status: Every Day     Current packs/day: 0.00     Average packs/day: 1 pack/day for 39.0 years (39.0 ttl pk-yrs)     Types: Vaping with nicotine, Cigarettes     Start date:      Last attempt to quit: 2016     Years since quittin.1     Passive exposure: Current    Smokeless tobacco: Never   Substance and Sexual Activity    Alcohol use: Not Currently    Drug use: Never    Sexual activity: Yes     Partners: Male     Social Drivers of Health     Financial Resource Strain: Patient Declined (2024)    Overall Financial Resource Strain (CARDIA)     Difficulty of Paying Living Expenses: Patient declined   Food Insecurity: Patient Declined (2024)    Hunger Vital Sign     Worried About Running Out of Food in the Last Year: Patient declined     Ran Out of Food in the Last Year: Patient declined   Physical Activity: Inactive (2024)    Exercise Vital Sign     Days of Exercise per Week: 0 days      Minutes of Exercise per Session: 0 min   Stress: Patient Declined (9/23/2024)    [employer] - Occupational Stress Questionnaire     Feeling of Stress : Patient declined   Housing Stability: Unknown (9/23/2024)    Housing Stability Vital Sign     Unable to Pay for Housing in the Last Year: Patient declined

## 2025-03-14 ENCOUNTER — HOSPITAL ENCOUNTER (OUTPATIENT)
Dept: RADIOLOGY | Facility: HOSPITAL | Age: 60
Discharge: HOME | End: 2025-03-14
Attending: NURSE PRACTITIONER
Payer: COMMERCIAL

## 2025-03-14 DIAGNOSIS — Z87.891 PERSONAL HISTORY OF SMOKING: ICD-10-CM

## 2025-03-14 PROCEDURE — 71271 CT THORAX LUNG CANCER SCR C-: CPT | Mod: TC

## 2025-03-18 ENCOUNTER — RESULTS FOLLOW-UP (OUTPATIENT)
Dept: FAMILY MEDICINE | Facility: CLINIC | Age: 60
End: 2025-03-18

## 2025-03-18 NOTE — PROGRESS NOTES
LDCT looks good, no nodules.  Repeat 1 year   [Time Spent: ___ minutes] : I have spent [unfilled] minutes of time on the encounter.

## 2025-03-24 ENCOUNTER — TELEPHONE (OUTPATIENT)
Dept: RADIOLOGY | Facility: HOSPITAL | Age: 60
End: 2025-03-24

## 2025-03-24 ENCOUNTER — HOSPITAL ENCOUNTER (OUTPATIENT)
Dept: RADIOLOGY | Facility: HOSPITAL | Age: 60
Discharge: HOME | End: 2025-03-24
Attending: NURSE PRACTITIONER
Payer: COMMERCIAL

## 2025-03-24 DIAGNOSIS — Z91.89 AT HIGH RISK FOR BREAST CANCER: ICD-10-CM

## 2025-03-24 DIAGNOSIS — R92.8 ABNORMAL FINDINGS ON DIAGNOSTIC IMAGING OF BREAST: Primary | ICD-10-CM

## 2025-03-24 DIAGNOSIS — Z12.31 BREAST CANCER SCREENING BY MAMMOGRAM: ICD-10-CM

## 2025-03-24 PROCEDURE — 76641 ULTRASOUND BREAST COMPLETE: CPT | Mod: 26,50,, | Performed by: STUDENT IN AN ORGANIZED HEALTH CARE EDUCATION/TRAINING PROGRAM

## 2025-03-24 PROCEDURE — 77066 DX MAMMO INCL CAD BI: CPT | Mod: 26,,, | Performed by: STUDENT IN AN ORGANIZED HEALTH CARE EDUCATION/TRAINING PROGRAM

## 2025-03-24 PROCEDURE — 77062 BREAST TOMOSYNTHESIS BI: CPT | Mod: 26,,, | Performed by: STUDENT IN AN ORGANIZED HEALTH CARE EDUCATION/TRAINING PROGRAM

## 2025-03-24 PROCEDURE — 76641 ULTRASOUND BREAST COMPLETE: CPT | Mod: TC,50

## 2025-03-24 PROCEDURE — 77067 SCR MAMMO BI INCL CAD: CPT | Mod: TC

## 2025-03-24 PROCEDURE — 77066 DX MAMMO INCL CAD BI: CPT | Mod: TC

## 2025-03-24 NOTE — CARE UPDATE
Radiologist recommends punch biopsy- bilateral breasts. Patient requests to see Dr. Moraes. Waiting on radiologist's final report. Will contact the office to schedule appointment for the patient.

## 2025-06-03 DIAGNOSIS — I10 PRIMARY HYPERTENSION: ICD-10-CM

## 2025-06-03 RX ORDER — AMLODIPINE BESYLATE 5 MG/1
TABLET ORAL
Qty: 90 TABLET | Refills: 3 | Status: SHIPPED | OUTPATIENT
Start: 2025-06-03

## (undated) DEVICE — GUIDEWIRE INQWIRE SE 3MM JTIP

## (undated) DEVICE — GLOVE PROTEXIS BLUE LATEX 7.5

## (undated) DEVICE — PACK OR CLEAN UP COMBO SIZE 2

## (undated) DEVICE — PAD DEFIB CADENCE ADULT R2

## (undated) DEVICE — KIT HAND CONTROL HIGH PRESSUR

## (undated) DEVICE — KIT MANIFOLD LOW PRESS TUBING

## (undated) DEVICE — CATH JACKY RADIAL 5FR 100CM

## (undated) DEVICE — GLOVE 7.5 PROTEXIS PI MICRO

## (undated) DEVICE — CONTRAST ISOVUE 370 500ML MULT

## (undated) DEVICE — BAND TR COMP DEVICE REG 24CM

## (undated) DEVICE — Device

## (undated) DEVICE — CANNULA DUAL CO2/O2 NASAL 7FT

## (undated) DEVICE — COVER PROBE US 5.5X58L NON LTX